# Patient Record
Sex: MALE | Race: WHITE | NOT HISPANIC OR LATINO | URBAN - METROPOLITAN AREA
[De-identification: names, ages, dates, MRNs, and addresses within clinical notes are randomized per-mention and may not be internally consistent; named-entity substitution may affect disease eponyms.]

---

## 2017-05-19 ENCOUNTER — EMERGENCY (EMERGENCY)
Facility: HOSPITAL | Age: 62
LOS: 1 days | Discharge: HOME | End: 2017-05-19
Attending: EMERGENCY MEDICINE
Payer: MEDICARE

## 2017-05-19 VITALS
DIASTOLIC BLOOD PRESSURE: 102 MMHG | OXYGEN SATURATION: 99 % | WEIGHT: 149.91 LBS | HEIGHT: 64 IN | RESPIRATION RATE: 18 BRPM | TEMPERATURE: 98 F | SYSTOLIC BLOOD PRESSURE: 186 MMHG | HEART RATE: 79 BPM

## 2017-05-19 DIAGNOSIS — Z98.89 OTHER SPECIFIED POSTPROCEDURAL STATES: Chronic | ICD-10-CM

## 2017-05-19 LAB
ALBUMIN SERPL ELPH-MCNC: 4.1 G/DL — SIGNIFICANT CHANGE UP (ref 3.5–5)
ALP SERPL-CCNC: 57 U/L — SIGNIFICANT CHANGE UP (ref 40–120)
ALT FLD-CCNC: 41 U/L DA — SIGNIFICANT CHANGE UP (ref 10–60)
ANION GAP SERPL CALC-SCNC: 8 MMOL/L — SIGNIFICANT CHANGE UP (ref 5–17)
AST SERPL-CCNC: 48 U/L — HIGH (ref 10–40)
BASOPHILS # BLD AUTO: 0.1 K/UL — SIGNIFICANT CHANGE UP (ref 0–0.2)
BASOPHILS NFR BLD AUTO: 1.3 % — SIGNIFICANT CHANGE UP (ref 0–2)
BILIRUB SERPL-MCNC: 0.8 MG/DL — SIGNIFICANT CHANGE UP (ref 0.2–1.2)
BUN SERPL-MCNC: 11 MG/DL — SIGNIFICANT CHANGE UP (ref 7–18)
CALCIUM SERPL-MCNC: 9.2 MG/DL — SIGNIFICANT CHANGE UP (ref 8.4–10.5)
CHLORIDE SERPL-SCNC: 106 MMOL/L — SIGNIFICANT CHANGE UP (ref 96–108)
CO2 SERPL-SCNC: 25 MMOL/L — SIGNIFICANT CHANGE UP (ref 22–31)
CREAT SERPL-MCNC: 0.82 MG/DL — SIGNIFICANT CHANGE UP (ref 0.5–1.3)
EOSINOPHIL # BLD AUTO: 0.1 K/UL — SIGNIFICANT CHANGE UP (ref 0–0.5)
EOSINOPHIL NFR BLD AUTO: 0.8 % — SIGNIFICANT CHANGE UP (ref 0–6)
GLUCOSE SERPL-MCNC: 126 MG/DL — HIGH (ref 70–99)
HCT VFR BLD CALC: 49.8 % — SIGNIFICANT CHANGE UP (ref 39–50)
HGB BLD-MCNC: 16.9 G/DL — SIGNIFICANT CHANGE UP (ref 13–17)
LYMPHOCYTES # BLD AUTO: 1.9 K/UL — SIGNIFICANT CHANGE UP (ref 1–3.3)
LYMPHOCYTES # BLD AUTO: 17.6 % — SIGNIFICANT CHANGE UP (ref 13–44)
MCHC RBC-ENTMCNC: 31 PG — SIGNIFICANT CHANGE UP (ref 27–34)
MCHC RBC-ENTMCNC: 33.9 GM/DL — SIGNIFICANT CHANGE UP (ref 32–36)
MCV RBC AUTO: 91.4 FL — SIGNIFICANT CHANGE UP (ref 80–100)
MONOCYTES # BLD AUTO: 0.9 K/UL — SIGNIFICANT CHANGE UP (ref 0–0.9)
MONOCYTES NFR BLD AUTO: 8.3 % — SIGNIFICANT CHANGE UP (ref 2–14)
NEUTROPHILS # BLD AUTO: 7.7 K/UL — HIGH (ref 1.8–7.4)
NEUTROPHILS NFR BLD AUTO: 72 % — SIGNIFICANT CHANGE UP (ref 43–77)
PLATELET # BLD AUTO: 376 K/UL — SIGNIFICANT CHANGE UP (ref 150–400)
POTASSIUM SERPL-MCNC: 4.4 MMOL/L — SIGNIFICANT CHANGE UP (ref 3.5–5.3)
POTASSIUM SERPL-SCNC: 4.4 MMOL/L — SIGNIFICANT CHANGE UP (ref 3.5–5.3)
PROT SERPL-MCNC: 8 G/DL — SIGNIFICANT CHANGE UP (ref 6–8.3)
RBC # BLD: 5.44 M/UL — SIGNIFICANT CHANGE UP (ref 4.2–5.8)
RBC # FLD: 11.8 % — SIGNIFICANT CHANGE UP (ref 10.3–14.5)
SODIUM SERPL-SCNC: 139 MMOL/L — SIGNIFICANT CHANGE UP (ref 135–145)
TROPONIN I SERPL-MCNC: <0.015 NG/ML — SIGNIFICANT CHANGE UP (ref 0–0.04)
WBC # BLD: 10.7 K/UL — HIGH (ref 3.8–10.5)
WBC # FLD AUTO: 10.7 K/UL — HIGH (ref 3.8–10.5)

## 2017-05-19 PROCEDURE — 96375 TX/PRO/DX INJ NEW DRUG ADDON: CPT

## 2017-05-19 PROCEDURE — 71045 X-RAY EXAM CHEST 1 VIEW: CPT

## 2017-05-19 PROCEDURE — 84484 ASSAY OF TROPONIN QUANT: CPT

## 2017-05-19 PROCEDURE — 80053 COMPREHEN METABOLIC PANEL: CPT

## 2017-05-19 PROCEDURE — 93005 ELECTROCARDIOGRAM TRACING: CPT

## 2017-05-19 PROCEDURE — 96374 THER/PROPH/DIAG INJ IV PUSH: CPT

## 2017-05-19 PROCEDURE — 85027 COMPLETE CBC AUTOMATED: CPT

## 2017-05-19 PROCEDURE — 99284 EMERGENCY DEPT VISIT MOD MDM: CPT

## 2017-05-19 PROCEDURE — 99284 EMERGENCY DEPT VISIT MOD MDM: CPT | Mod: 25

## 2017-05-19 PROCEDURE — 71010: CPT | Mod: 26

## 2017-05-19 RX ORDER — ONDANSETRON 8 MG/1
4 TABLET, FILM COATED ORAL ONCE
Qty: 0 | Refills: 0 | Status: COMPLETED | OUTPATIENT
Start: 2017-05-19 | End: 2017-05-19

## 2017-05-19 RX ORDER — KETOROLAC TROMETHAMINE 30 MG/ML
30 SYRINGE (ML) INJECTION ONCE
Qty: 0 | Refills: 0 | Status: DISCONTINUED | OUTPATIENT
Start: 2017-05-19 | End: 2017-05-19

## 2017-05-19 RX ORDER — MORPHINE SULFATE 50 MG/1
2 CAPSULE, EXTENDED RELEASE ORAL ONCE
Qty: 0 | Refills: 0 | Status: DISCONTINUED | OUTPATIENT
Start: 2017-05-19 | End: 2017-05-19

## 2017-05-19 RX ADMIN — Medication 30 MILLIGRAM(S): at 09:16

## 2017-05-19 RX ADMIN — MORPHINE SULFATE 2 MILLIGRAM(S): 50 CAPSULE, EXTENDED RELEASE ORAL at 09:16

## 2017-05-19 RX ADMIN — ONDANSETRON 4 MILLIGRAM(S): 8 TABLET, FILM COATED ORAL at 09:16

## 2017-05-19 NOTE — ED PROVIDER NOTE - NS ED MD SCRIBE ATTENDING SCRIBE SECTIONS
PAST MEDICAL/SURGICAL/SOCIAL HISTORY/PHYSICAL EXAM/HIV/HISTORY OF PRESENT ILLNESS/DISPOSITION/VITAL SIGNS( Pullset)/REVIEW OF SYSTEMS

## 2017-05-19 NOTE — ED ADULT NURSE NOTE - OBJECTIVE STATEMENT
Patient came to the ED a/o x 3 ambulates c/o left side chest pain radiating to the left arm. Patient states the pain started 2 days ago, no c/o dizziness noted.

## 2017-05-19 NOTE — ED PROVIDER NOTE - MEDICAL DECISION MAKING DETAILS
chest wall pain, likely muscular given recent normal cardiac workup and normal labs and ecg today. pt had htn today, but attributed to pain. mentioned that he needs to recheck bp in a few days to determine if htn diagnosis. plans to follow up with pmd. gave copy of results. discussed cxr finding of hylar lymph node which he plans to follow up in a month. pain much improved with meds. discussed anticipatory guidance.

## 2017-05-19 NOTE — ED PROVIDER NOTE - OBJECTIVE STATEMENT
62 y/o male with PMHx of Diverticulitis and Seizure and PSHx of Appendectomy and Sigmoidectomy presents to the ED c/o chest pain and L arm pain today. Pt notes associated nausea. Pt went to the Cardiologist 1 month ago and had a stress test and full cardiac workup which came back normal. Pt notes lifting heavy objects regularly. Pt denies fever, chills, lightheadedness, diaphoresis, SOB, infectious symptoms, or any other complaints. Allergies: Motrin, Aspirin (hives). 62 y/o male with PMHx of Diverticulitis and Seizure and PSHx of Appendectomy and Sigmoidectomy presents to the ED c/o chest pain and L arm pain today. Pt notes associated nausea. Pt went to the Cardiologist 1 month ago and had a stress test and full cardiac workup (stress test and catheterism) which were normal. Pt notes lifting heavy objects regularly. Pt denies fever, chills, lightheadedness, diaphoresis, SOB, infectious symptoms, or any other complaints. Allergies: Motrin, Aspirin (hives).

## 2017-05-23 DIAGNOSIS — G40.909 EPILEPSY, UNSPECIFIED, NOT INTRACTABLE, WITHOUT STATUS EPILEPTICUS: ICD-10-CM

## 2017-05-23 DIAGNOSIS — R03.0 ELEVATED BLOOD-PRESSURE READING, WITHOUT DIAGNOSIS OF HYPERTENSION: ICD-10-CM

## 2017-05-23 DIAGNOSIS — R11.0 NAUSEA: ICD-10-CM

## 2017-05-23 DIAGNOSIS — R59.0 LOCALIZED ENLARGED LYMPH NODES: ICD-10-CM

## 2017-05-23 DIAGNOSIS — Z88.6 ALLERGY STATUS TO ANALGESIC AGENT: ICD-10-CM

## 2017-05-23 DIAGNOSIS — M79.602 PAIN IN LEFT ARM: ICD-10-CM

## 2017-05-23 DIAGNOSIS — Z87.19 PERSONAL HISTORY OF OTHER DISEASES OF THE DIGESTIVE SYSTEM: ICD-10-CM

## 2017-05-23 DIAGNOSIS — R00.0 TACHYCARDIA, UNSPECIFIED: ICD-10-CM

## 2017-05-23 DIAGNOSIS — R07.89 OTHER CHEST PAIN: ICD-10-CM

## 2018-03-06 NOTE — ED PROVIDER NOTE - CONSTITUTIONAL, MLM
[phone] normal... Well appearing, well nourished, awake, alert, oriented to person, place, time/situation and in no apparent distress.

## 2018-04-04 ENCOUNTER — EMERGENCY (EMERGENCY)
Facility: HOSPITAL | Age: 63
LOS: 1 days | Discharge: HOME | End: 2018-04-04
Attending: EMERGENCY MEDICINE
Payer: MEDICARE

## 2018-04-04 VITALS
DIASTOLIC BLOOD PRESSURE: 97 MMHG | RESPIRATION RATE: 18 BRPM | SYSTOLIC BLOOD PRESSURE: 132 MMHG | OXYGEN SATURATION: 98 % | HEART RATE: 106 BPM | TEMPERATURE: 98 F

## 2018-04-04 VITALS
SYSTOLIC BLOOD PRESSURE: 134 MMHG | TEMPERATURE: 98 F | OXYGEN SATURATION: 100 % | RESPIRATION RATE: 16 BRPM | DIASTOLIC BLOOD PRESSURE: 88 MMHG | HEART RATE: 95 BPM

## 2018-04-04 DIAGNOSIS — Z98.89 OTHER SPECIFIED POSTPROCEDURAL STATES: Chronic | ICD-10-CM

## 2018-04-04 LAB
ALBUMIN SERPL ELPH-MCNC: 4.5 G/DL — SIGNIFICANT CHANGE UP (ref 3.5–5)
ALP SERPL-CCNC: 57 U/L — SIGNIFICANT CHANGE UP (ref 40–120)
ALT FLD-CCNC: 36 U/L DA — SIGNIFICANT CHANGE UP (ref 10–60)
ANION GAP SERPL CALC-SCNC: 10 MMOL/L — SIGNIFICANT CHANGE UP (ref 5–17)
AST SERPL-CCNC: 34 U/L — SIGNIFICANT CHANGE UP (ref 10–40)
BASOPHILS # BLD AUTO: 0.1 K/UL — SIGNIFICANT CHANGE UP (ref 0–0.2)
BASOPHILS NFR BLD AUTO: 0.4 % — SIGNIFICANT CHANGE UP (ref 0–2)
BILIRUB SERPL-MCNC: 0.9 MG/DL — SIGNIFICANT CHANGE UP (ref 0.2–1.2)
BUN SERPL-MCNC: 15 MG/DL — SIGNIFICANT CHANGE UP (ref 7–18)
CALCIUM SERPL-MCNC: 9.4 MG/DL — SIGNIFICANT CHANGE UP (ref 8.4–10.5)
CHLORIDE SERPL-SCNC: 101 MMOL/L — SIGNIFICANT CHANGE UP (ref 96–108)
CK MB BLD-MCNC: 1 % — SIGNIFICANT CHANGE UP (ref 0–3.5)
CK MB CFR SERPL CALC: 2.7 NG/ML — SIGNIFICANT CHANGE UP (ref 0–3.6)
CK SERPL-CCNC: 278 U/L — HIGH (ref 35–232)
CO2 SERPL-SCNC: 25 MMOL/L — SIGNIFICANT CHANGE UP (ref 22–31)
CREAT SERPL-MCNC: 0.96 MG/DL — SIGNIFICANT CHANGE UP (ref 0.5–1.3)
EOSINOPHIL # BLD AUTO: 0 K/UL — SIGNIFICANT CHANGE UP (ref 0–0.5)
EOSINOPHIL NFR BLD AUTO: 0.1 % — SIGNIFICANT CHANGE UP (ref 0–6)
GLUCOSE SERPL-MCNC: 103 MG/DL — HIGH (ref 70–99)
HCT VFR BLD CALC: 54.5 % — HIGH (ref 39–50)
HGB BLD-MCNC: 18 G/DL — HIGH (ref 13–17)
LIDOCAIN IGE QN: 83 U/L — SIGNIFICANT CHANGE UP (ref 73–393)
LYMPHOCYTES # BLD AUTO: 1.5 K/UL — SIGNIFICANT CHANGE UP (ref 1–3.3)
LYMPHOCYTES # BLD AUTO: 9.2 % — LOW (ref 13–44)
MCHC RBC-ENTMCNC: 30.6 PG — SIGNIFICANT CHANGE UP (ref 27–34)
MCHC RBC-ENTMCNC: 33 GM/DL — SIGNIFICANT CHANGE UP (ref 32–36)
MCV RBC AUTO: 92.5 FL — SIGNIFICANT CHANGE UP (ref 80–100)
MONOCYTES # BLD AUTO: 1.1 K/UL — HIGH (ref 0–0.9)
MONOCYTES NFR BLD AUTO: 6.9 % — SIGNIFICANT CHANGE UP (ref 2–14)
NEUTROPHILS # BLD AUTO: 13.5 K/UL — HIGH (ref 1.8–7.4)
NEUTROPHILS NFR BLD AUTO: 83.5 % — HIGH (ref 43–77)
PLATELET # BLD AUTO: 356 K/UL — SIGNIFICANT CHANGE UP (ref 150–400)
POTASSIUM SERPL-MCNC: 3.9 MMOL/L — SIGNIFICANT CHANGE UP (ref 3.5–5.3)
POTASSIUM SERPL-SCNC: 3.9 MMOL/L — SIGNIFICANT CHANGE UP (ref 3.5–5.3)
PROT SERPL-MCNC: 8.4 G/DL — HIGH (ref 6–8.3)
RBC # BLD: 5.89 M/UL — HIGH (ref 4.2–5.8)
RBC # FLD: 11.5 % — SIGNIFICANT CHANGE UP (ref 10.3–14.5)
SODIUM SERPL-SCNC: 136 MMOL/L — SIGNIFICANT CHANGE UP (ref 135–145)
TROPONIN I SERPL-MCNC: <0.015 NG/ML — SIGNIFICANT CHANGE UP (ref 0–0.04)
WBC # BLD: 16.1 K/UL — HIGH (ref 3.8–10.5)
WBC # FLD AUTO: 16.1 K/UL — HIGH (ref 3.8–10.5)

## 2018-04-04 PROCEDURE — 99284 EMERGENCY DEPT VISIT MOD MDM: CPT | Mod: 25

## 2018-04-04 PROCEDURE — 82553 CREATINE MB FRACTION: CPT

## 2018-04-04 PROCEDURE — 93005 ELECTROCARDIOGRAM TRACING: CPT

## 2018-04-04 PROCEDURE — 71046 X-RAY EXAM CHEST 2 VIEWS: CPT | Mod: 26

## 2018-04-04 PROCEDURE — 84484 ASSAY OF TROPONIN QUANT: CPT

## 2018-04-04 PROCEDURE — 99284 EMERGENCY DEPT VISIT MOD MDM: CPT

## 2018-04-04 PROCEDURE — 80053 COMPREHEN METABOLIC PANEL: CPT

## 2018-04-04 PROCEDURE — 96375 TX/PRO/DX INJ NEW DRUG ADDON: CPT

## 2018-04-04 PROCEDURE — 71046 X-RAY EXAM CHEST 2 VIEWS: CPT

## 2018-04-04 PROCEDURE — 82550 ASSAY OF CK (CPK): CPT

## 2018-04-04 PROCEDURE — 85027 COMPLETE CBC AUTOMATED: CPT

## 2018-04-04 PROCEDURE — 96374 THER/PROPH/DIAG INJ IV PUSH: CPT

## 2018-04-04 PROCEDURE — 83690 ASSAY OF LIPASE: CPT

## 2018-04-04 RX ORDER — SODIUM CHLORIDE 9 MG/ML
2000 INJECTION INTRAMUSCULAR; INTRAVENOUS; SUBCUTANEOUS ONCE
Qty: 0 | Refills: 0 | Status: COMPLETED | OUTPATIENT
Start: 2018-04-04 | End: 2018-04-04

## 2018-04-04 RX ORDER — METRONIDAZOLE 500 MG
1 TABLET ORAL
Qty: 30 | Refills: 0
Start: 2018-04-04 | End: 2018-04-13

## 2018-04-04 RX ORDER — ONDANSETRON 8 MG/1
4 TABLET, FILM COATED ORAL ONCE
Qty: 0 | Refills: 0 | Status: COMPLETED | OUTPATIENT
Start: 2018-04-04 | End: 2018-04-04

## 2018-04-04 RX ORDER — FAMOTIDINE 10 MG/ML
20 INJECTION INTRAVENOUS ONCE
Qty: 0 | Refills: 0 | Status: COMPLETED | OUTPATIENT
Start: 2018-04-04 | End: 2018-04-04

## 2018-04-04 RX ORDER — MOXIFLOXACIN HYDROCHLORIDE TABLETS, 400 MG 400 MG/1
1 TABLET, FILM COATED ORAL
Qty: 20 | Refills: 0
Start: 2018-04-04 | End: 2018-04-13

## 2018-04-04 RX ADMIN — ONDANSETRON 4 MILLIGRAM(S): 8 TABLET, FILM COATED ORAL at 14:52

## 2018-04-04 RX ADMIN — SODIUM CHLORIDE 2000 MILLILITER(S): 9 INJECTION INTRAMUSCULAR; INTRAVENOUS; SUBCUTANEOUS at 14:53

## 2018-04-04 RX ADMIN — FAMOTIDINE 20 MILLIGRAM(S): 10 INJECTION INTRAVENOUS at 14:52

## 2018-04-04 NOTE — ED PROVIDER NOTE - PROGRESS NOTE DETAILS
shahzad  pt ate pedialyte pop and able to drink water with no further vomiting  ambulatory in ed without asst.

## 2018-04-04 NOTE — ED PROVIDER NOTE - MEDICAL DECISION MAKING DETAILS
pt with recent travel no with n/v/d  will give fluids, anitemetics and reassess  also will cover with cipro/flagyl givnen travel.  pt to follow up with pmd

## 2018-04-04 NOTE — ED ADULT NURSE NOTE - OBJECTIVE STATEMENT
pt a&ox3, here with c/o vomiting. pt states he returned from [place] one week ago and has been having n/v/d since then. denies fever, chills, CP.

## 2018-04-04 NOTE — ED PROVIDER NOTE - OBJECTIVE STATEMENT
travelers diarrhea travelers diarrhea  pt recently returned from [place] and now has n/v/d for several days  unable to keep down any liquids or food for over one day.  subjective fever and chills

## 2019-03-18 ENCOUNTER — APPOINTMENT (OUTPATIENT)
Dept: OTHER | Facility: CLINIC | Age: 64
End: 2019-03-18

## 2019-03-18 ENCOUNTER — APPOINTMENT (OUTPATIENT)
Dept: OTHER | Facility: CLINIC | Age: 64
End: 2019-03-18
Payer: COMMERCIAL

## 2019-03-18 VITALS
OXYGEN SATURATION: 96 % | RESPIRATION RATE: 16 BRPM | DIASTOLIC BLOOD PRESSURE: 100 MMHG | BODY MASS INDEX: 24.75 KG/M2 | SYSTOLIC BLOOD PRESSURE: 150 MMHG | HEIGHT: 64 IN | HEART RATE: 84 BPM | WEIGHT: 145 LBS

## 2019-03-18 VITALS — SYSTOLIC BLOOD PRESSURE: 140 MMHG | DIASTOLIC BLOOD PRESSURE: 88 MMHG

## 2019-03-18 DIAGNOSIS — Z82.49 FAMILY HISTORY OF ISCHEMIC HEART DISEASE AND OTHER DISEASES OF THE CIRCULATORY SYSTEM: ICD-10-CM

## 2019-03-18 DIAGNOSIS — I10 ESSENTIAL (PRIMARY) HYPERTENSION: ICD-10-CM

## 2019-03-18 DIAGNOSIS — N40.0 BENIGN PROSTATIC HYPERPLASIA WITHOUT LOWER URINARY TRACT SYMPMS: ICD-10-CM

## 2019-03-18 DIAGNOSIS — M75.100 UNSPECIFIED ROTATOR CUFF TEAR OR RUPTURE OF UNSPECIFIED SHOULDER, NOT SPECIFIED AS TRAUMATIC: ICD-10-CM

## 2019-03-18 DIAGNOSIS — K21.9 GASTRO-ESOPHAGEAL REFLUX DISEASE W/OUT ESOPHAGITIS: ICD-10-CM

## 2019-03-18 PROCEDURE — 94010 BREATHING CAPACITY TEST: CPT

## 2019-03-18 PROCEDURE — 99396 PREV VISIT EST AGE 40-64: CPT | Mod: 25

## 2019-03-18 RX ORDER — ALPRAZOLAM 0.5 MG/1
0.5 TABLET ORAL
Qty: 6 | Refills: 0 | Status: DISCONTINUED | COMMUNITY
Start: 2019-02-09 | End: 2019-03-18

## 2019-03-18 RX ORDER — METOPROLOL SUCCINATE 50 MG/1
50 TABLET, EXTENDED RELEASE ORAL
Qty: 30 | Refills: 0 | Status: ACTIVE | COMMUNITY
Start: 2019-03-14

## 2019-03-18 RX ORDER — ATORVASTATIN CALCIUM 10 MG/1
10 TABLET, FILM COATED ORAL
Qty: 30 | Refills: 0 | Status: ACTIVE | COMMUNITY
Start: 2018-11-10

## 2019-03-18 RX ORDER — FAMOTIDINE 20 MG/1
20 TABLET, FILM COATED ORAL
Qty: 60 | Refills: 0 | Status: ACTIVE | COMMUNITY
Start: 2018-12-19

## 2019-03-18 NOTE — DISCUSSION/SUMMARY
[Patient seen for WTC Monitoring ___] : Patient was seen for WTC monitoring [unfilled] [Please See Note in Chart and Documentation in Trial DB] : Please see note in chart and documentation in Trial DB. [FreeTextEntry3] : [facility] Monitoring Evaluation\par \par  ID#: [id number] 	               \par  Visit: 2	                        \par  Date: 3/18/19\par \par HPI: 63 yr old white male presents to [facility] for 2nd annual monitoring visit. He c/o occasional wheezing and cough but spoke to his PCP who does not want to treat due to very sporadic episodes per pt. Thought perhaps caused by GERD. He is treated for BPH, HTN, and HLD. He's had GERD since 1990 and has Low's esophagus. \par \par PCP: Dr. 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 He worked every day for first few days then 3x week as it was part of his regular route. \par Occupational Hx: DOS\par PMH:See Allscripts and Trial DB\par PSH: See Allscripts and Trial DB \par Family History: See Allscripts and Trial DB \par Preventive Screening: \par Colonoscopy-2017                           \par CXR-will do today\par Prostate exam-2018\par Allergies: See Allscripts and Trial DB \par Meds: See Trial DB and Allscripts \par Social  Hx: See Allscripts and Trial DB\par Smoking Status: See Allscripts and Trial DB \par Review of Systems-IAMQ reviewed with patient\par \par PHYSICAL EXAM: See Trial DB.  \par \par Spirometry: Reviewed. Normal\par \par Assessment:\par HTN\par HLD\par BPH\par Torn rotator cuff\par \par Plan: \par CBC, CMP, lipids, UA, spirometry, CXR ordered\par Continue present medications\par F/U at [facility] in 1 year\par \par \par   \par \par \par \par \par \par \par \par \par

## 2019-03-19 LAB
ALBUMIN SERPL ELPH-MCNC: 4.7 G/DL
ALP BLD-CCNC: 46 U/L
ALT SERPL-CCNC: 25 U/L
ANION GAP SERPL CALC-SCNC: 9 MMOL/L
APPEARANCE: ABNORMAL
AST SERPL-CCNC: 28 U/L
BACTERIA: NEGATIVE
BASOPHILS # BLD AUTO: 0.09 K/UL
BASOPHILS NFR BLD AUTO: 1.1 %
BILIRUB SERPL-MCNC: 0.4 MG/DL
BILIRUBIN URINE: NEGATIVE
BLOOD URINE: NEGATIVE
BUN SERPL-MCNC: 14 MG/DL
CALCIUM SERPL-MCNC: 10.1 MG/DL
CHLORIDE SERPL-SCNC: 102 MMOL/L
CHOLEST SERPL-MCNC: 209 MG/DL
CHOLEST/HDLC SERPL: 3.4 RATIO
CO2 SERPL-SCNC: 31 MMOL/L
COLOR: YELLOW
CREAT SERPL-MCNC: 0.73 MG/DL
EOSINOPHIL # BLD AUTO: 0.08 K/UL
EOSINOPHIL NFR BLD AUTO: 1 %
GLUCOSE QUALITATIVE U: NEGATIVE
GLUCOSE SERPL-MCNC: 104 MG/DL
HCT VFR BLD CALC: 49 %
HDLC SERPL-MCNC: 62 MG/DL
HGB BLD-MCNC: 16 G/DL
HYALINE CASTS: 0 /LPF
IMM GRANULOCYTES NFR BLD AUTO: 0.4 %
KETONES URINE: NEGATIVE
LDLC SERPL CALC-MCNC: 101 MG/DL
LEUKOCYTE ESTERASE URINE: NEGATIVE
LYMPHOCYTES # BLD AUTO: 1.45 K/UL
LYMPHOCYTES NFR BLD AUTO: 17.4 %
MAN DIFF?: NORMAL
MCHC RBC-ENTMCNC: 31 PG
MCHC RBC-ENTMCNC: 32.7 GM/DL
MCV RBC AUTO: 95 FL
MICROSCOPIC-UA: NORMAL
MONOCYTES # BLD AUTO: 0.81 K/UL
MONOCYTES NFR BLD AUTO: 9.7 %
NEUTROPHILS # BLD AUTO: 5.86 K/UL
NEUTROPHILS NFR BLD AUTO: 70.4 %
NITRITE URINE: NEGATIVE
PH URINE: 8.5
PLATELET # BLD AUTO: 363 K/UL
POTASSIUM SERPL-SCNC: 5.1 MMOL/L
PROT SERPL-MCNC: 7.3 G/DL
PROTEIN URINE: NEGATIVE
RBC # BLD: 5.16 M/UL
RBC # FLD: 12.7 %
RED BLOOD CELLS URINE: 1 /HPF
SODIUM SERPL-SCNC: 142 MMOL/L
SPECIFIC GRAVITY URINE: 1.02
SQUAMOUS EPITHELIAL CELLS: 0 /HPF
TRIGL SERPL-MCNC: 231 MG/DL
UROBILINOGEN URINE: NORMAL
WBC # FLD AUTO: 8.32 K/UL
WHITE BLOOD CELLS URINE: 1 /HPF

## 2019-12-06 ENCOUNTER — INPATIENT (INPATIENT)
Facility: HOSPITAL | Age: 64
LOS: 2 days | Discharge: HOME | DRG: 379 | End: 2019-12-09
Attending: INTERNAL MEDICINE | Admitting: INTERNAL MEDICINE
Payer: MEDICARE

## 2019-12-06 ENCOUNTER — RESULT REVIEW (OUTPATIENT)
Age: 64
End: 2019-12-06

## 2019-12-06 VITALS
HEART RATE: 121 BPM | OXYGEN SATURATION: 100 % | HEIGHT: 64 IN | RESPIRATION RATE: 20 BRPM | SYSTOLIC BLOOD PRESSURE: 126 MMHG | DIASTOLIC BLOOD PRESSURE: 86 MMHG | WEIGHT: 141.98 LBS | TEMPERATURE: 98 F

## 2019-12-06 DIAGNOSIS — E78.5 HYPERLIPIDEMIA, UNSPECIFIED: ICD-10-CM

## 2019-12-06 DIAGNOSIS — K92.2 GASTROINTESTINAL HEMORRHAGE, UNSPECIFIED: ICD-10-CM

## 2019-12-06 DIAGNOSIS — Z29.9 ENCOUNTER FOR PROPHYLACTIC MEASURES, UNSPECIFIED: ICD-10-CM

## 2019-12-06 DIAGNOSIS — Z98.89 OTHER SPECIFIED POSTPROCEDURAL STATES: Chronic | ICD-10-CM

## 2019-12-06 DIAGNOSIS — R19.5 OTHER FECAL ABNORMALITIES: ICD-10-CM

## 2019-12-06 DIAGNOSIS — N40.0 BENIGN PROSTATIC HYPERPLASIA WITHOUT LOWER URINARY TRACT SYMPTOMS: ICD-10-CM

## 2019-12-06 DIAGNOSIS — K29.70 GASTRITIS, UNSPECIFIED, WITHOUT BLEEDING: ICD-10-CM

## 2019-12-06 LAB
24R-OH-CALCIDIOL SERPL-MCNC: 54.4 NG/ML — SIGNIFICANT CHANGE UP (ref 30–80)
ALBUMIN SERPL ELPH-MCNC: 2.9 G/DL — LOW (ref 3.5–5)
ALBUMIN SERPL ELPH-MCNC: 3.5 G/DL — SIGNIFICANT CHANGE UP (ref 3.5–5)
ALP SERPL-CCNC: 33 U/L — LOW (ref 40–120)
ALP SERPL-CCNC: 39 U/L — LOW (ref 40–120)
ALT FLD-CCNC: 24 U/L DA — SIGNIFICANT CHANGE UP (ref 10–60)
ALT FLD-CCNC: 29 U/L DA — SIGNIFICANT CHANGE UP (ref 10–60)
ANION GAP SERPL CALC-SCNC: 7 MMOL/L — SIGNIFICANT CHANGE UP (ref 5–17)
ANION GAP SERPL CALC-SCNC: 8 MMOL/L — SIGNIFICANT CHANGE UP (ref 5–17)
APTT BLD: 25 SEC — LOW (ref 27.5–36.3)
AST SERPL-CCNC: 15 U/L — SIGNIFICANT CHANGE UP (ref 10–40)
AST SERPL-CCNC: 17 U/L — SIGNIFICANT CHANGE UP (ref 10–40)
BASOPHILS # BLD AUTO: 0.04 K/UL — SIGNIFICANT CHANGE UP (ref 0–0.2)
BASOPHILS NFR BLD AUTO: 0.3 % — SIGNIFICANT CHANGE UP (ref 0–2)
BILIRUB DIRECT SERPL-MCNC: 0.1 MG/DL — SIGNIFICANT CHANGE UP (ref 0–0.2)
BILIRUB INDIRECT FLD-MCNC: 0.3 MG/DL — SIGNIFICANT CHANGE UP (ref 0.2–1)
BILIRUB SERPL-MCNC: 0.4 MG/DL — SIGNIFICANT CHANGE UP (ref 0.2–1.2)
BUN SERPL-MCNC: 13 MG/DL — SIGNIFICANT CHANGE UP (ref 7–18)
BUN SERPL-MCNC: 21 MG/DL — HIGH (ref 7–18)
CALCIUM SERPL-MCNC: 8.2 MG/DL — LOW (ref 8.4–10.5)
CALCIUM SERPL-MCNC: 9 MG/DL — SIGNIFICANT CHANGE UP (ref 8.4–10.5)
CHLORIDE SERPL-SCNC: 104 MMOL/L — SIGNIFICANT CHANGE UP (ref 96–108)
CHLORIDE SERPL-SCNC: 110 MMOL/L — HIGH (ref 96–108)
CHOLEST SERPL-MCNC: 125 MG/DL — SIGNIFICANT CHANGE UP (ref 10–199)
CO2 SERPL-SCNC: 24 MMOL/L — SIGNIFICANT CHANGE UP (ref 22–31)
CO2 SERPL-SCNC: 26 MMOL/L — SIGNIFICANT CHANGE UP (ref 22–31)
CREAT SERPL-MCNC: 0.62 MG/DL — SIGNIFICANT CHANGE UP (ref 0.5–1.3)
CREAT SERPL-MCNC: 0.84 MG/DL — SIGNIFICANT CHANGE UP (ref 0.5–1.3)
EOSINOPHIL # BLD AUTO: 0.01 K/UL — SIGNIFICANT CHANGE UP (ref 0–0.5)
EOSINOPHIL NFR BLD AUTO: 0.1 % — SIGNIFICANT CHANGE UP (ref 0–6)
FOLATE SERPL-MCNC: >20 NG/ML — SIGNIFICANT CHANGE UP
GLUCOSE SERPL-MCNC: 147 MG/DL — HIGH (ref 70–99)
GLUCOSE SERPL-MCNC: 90 MG/DL — SIGNIFICANT CHANGE UP (ref 70–99)
HBA1C BLD-MCNC: 5.4 % — SIGNIFICANT CHANGE UP (ref 4–5.6)
HCT VFR BLD CALC: 28.1 % — LOW (ref 39–50)
HCT VFR BLD CALC: 28.6 % — LOW (ref 39–50)
HCT VFR BLD CALC: 34 % — LOW (ref 39–50)
HDLC SERPL-MCNC: 40 MG/DL — SIGNIFICANT CHANGE UP
HGB BLD-MCNC: 11.7 G/DL — LOW (ref 13–17)
HGB BLD-MCNC: 9.3 G/DL — LOW (ref 13–17)
HGB BLD-MCNC: 9.6 G/DL — LOW (ref 13–17)
IMM GRANULOCYTES NFR BLD AUTO: 0.5 % — SIGNIFICANT CHANGE UP (ref 0–1.5)
INR BLD: 1.15 RATIO — SIGNIFICANT CHANGE UP (ref 0.88–1.16)
LIDOCAIN IGE QN: 71 U/L — LOW (ref 73–393)
LIPID PNL WITH DIRECT LDL SERPL: 61 MG/DL — SIGNIFICANT CHANGE UP
LYMPHOCYTES # BLD AUTO: 1.58 K/UL — SIGNIFICANT CHANGE UP (ref 1–3.3)
LYMPHOCYTES # BLD AUTO: 11.9 % — LOW (ref 13–44)
MCHC RBC-ENTMCNC: 31 PG — SIGNIFICANT CHANGE UP (ref 27–34)
MCHC RBC-ENTMCNC: 31.3 PG — SIGNIFICANT CHANGE UP (ref 27–34)
MCHC RBC-ENTMCNC: 31.5 PG — SIGNIFICANT CHANGE UP (ref 27–34)
MCHC RBC-ENTMCNC: 33.1 GM/DL — SIGNIFICANT CHANGE UP (ref 32–36)
MCHC RBC-ENTMCNC: 33.6 GM/DL — SIGNIFICANT CHANGE UP (ref 32–36)
MCHC RBC-ENTMCNC: 34.4 GM/DL — SIGNIFICANT CHANGE UP (ref 32–36)
MCV RBC AUTO: 91.4 FL — SIGNIFICANT CHANGE UP (ref 80–100)
MCV RBC AUTO: 93.2 FL — SIGNIFICANT CHANGE UP (ref 80–100)
MCV RBC AUTO: 93.7 FL — SIGNIFICANT CHANGE UP (ref 80–100)
MONOCYTES # BLD AUTO: 1.01 K/UL — HIGH (ref 0–0.9)
MONOCYTES NFR BLD AUTO: 7.6 % — SIGNIFICANT CHANGE UP (ref 2–14)
NEUTROPHILS # BLD AUTO: 10.58 K/UL — HIGH (ref 1.8–7.4)
NEUTROPHILS NFR BLD AUTO: 79.6 % — HIGH (ref 43–77)
NRBC # BLD: 0 /100 WBCS — SIGNIFICANT CHANGE UP (ref 0–0)
OB PNL STL: POSITIVE
PLATELET # BLD AUTO: 257 K/UL — SIGNIFICANT CHANGE UP (ref 150–400)
PLATELET # BLD AUTO: 277 K/UL — SIGNIFICANT CHANGE UP (ref 150–400)
PLATELET # BLD AUTO: 326 K/UL — SIGNIFICANT CHANGE UP (ref 150–400)
POTASSIUM SERPL-MCNC: 3.6 MMOL/L — SIGNIFICANT CHANGE UP (ref 3.5–5.3)
POTASSIUM SERPL-MCNC: 3.7 MMOL/L — SIGNIFICANT CHANGE UP (ref 3.5–5.3)
POTASSIUM SERPL-SCNC: 3.6 MMOL/L — SIGNIFICANT CHANGE UP (ref 3.5–5.3)
POTASSIUM SERPL-SCNC: 3.7 MMOL/L — SIGNIFICANT CHANGE UP (ref 3.5–5.3)
PROT SERPL-MCNC: 5.8 G/DL — LOW (ref 6–8.3)
PROT SERPL-MCNC: 7.1 G/DL — SIGNIFICANT CHANGE UP (ref 6–8.3)
PROTHROM AB SERPL-ACNC: 12.8 SEC — SIGNIFICANT CHANGE UP (ref 10–12.9)
RBC # BLD: 3 M/UL — LOW (ref 4.2–5.8)
RBC # BLD: 3.07 M/UL — LOW (ref 4.2–5.8)
RBC # BLD: 3.72 M/UL — LOW (ref 4.2–5.8)
RBC # FLD: 13 % — SIGNIFICANT CHANGE UP (ref 10.3–14.5)
RBC # FLD: 13.2 % — SIGNIFICANT CHANGE UP (ref 10.3–14.5)
RBC # FLD: 13.2 % — SIGNIFICANT CHANGE UP (ref 10.3–14.5)
SODIUM SERPL-SCNC: 138 MMOL/L — SIGNIFICANT CHANGE UP (ref 135–145)
SODIUM SERPL-SCNC: 141 MMOL/L — SIGNIFICANT CHANGE UP (ref 135–145)
TOTAL CHOLESTEROL/HDL RATIO MEASUREMENT: 3.1 RATIO — LOW (ref 3.4–9.6)
TRIGL SERPL-MCNC: 120 MG/DL — SIGNIFICANT CHANGE UP (ref 10–149)
TSH SERPL-MCNC: 1.12 UU/ML — SIGNIFICANT CHANGE UP (ref 0.34–4.82)
VIT B12 SERPL-MCNC: 566 PG/ML — SIGNIFICANT CHANGE UP (ref 232–1245)
WBC # BLD: 11.81 K/UL — HIGH (ref 3.8–10.5)
WBC # BLD: 13.29 K/UL — HIGH (ref 3.8–10.5)
WBC # BLD: 14.89 K/UL — HIGH (ref 3.8–10.5)
WBC # FLD AUTO: 11.81 K/UL — HIGH (ref 3.8–10.5)
WBC # FLD AUTO: 13.29 K/UL — HIGH (ref 3.8–10.5)
WBC # FLD AUTO: 14.89 K/UL — HIGH (ref 3.8–10.5)

## 2019-12-06 PROCEDURE — 88312 SPECIAL STAINS GROUP 1: CPT | Mod: 26

## 2019-12-06 PROCEDURE — 88305 TISSUE EXAM BY PATHOLOGIST: CPT | Mod: 26

## 2019-12-06 PROCEDURE — 74174 CTA ABD&PLVS W/CONTRAST: CPT | Mod: 26

## 2019-12-06 PROCEDURE — 99284 EMERGENCY DEPT VISIT MOD MDM: CPT

## 2019-12-06 RX ORDER — ONDANSETRON 8 MG/1
8 TABLET, FILM COATED ORAL ONCE
Refills: 0 | Status: COMPLETED | OUTPATIENT
Start: 2019-12-06 | End: 2019-12-06

## 2019-12-06 RX ORDER — ATORVASTATIN CALCIUM 80 MG/1
1 TABLET, FILM COATED ORAL
Qty: 0 | Refills: 0 | DISCHARGE

## 2019-12-06 RX ORDER — PANTOPRAZOLE SODIUM 20 MG/1
80 TABLET, DELAYED RELEASE ORAL ONCE
Refills: 0 | Status: COMPLETED | OUTPATIENT
Start: 2019-12-06 | End: 2019-12-06

## 2019-12-06 RX ORDER — PANTOPRAZOLE SODIUM 20 MG/1
8 TABLET, DELAYED RELEASE ORAL
Qty: 80 | Refills: 0 | Status: DISCONTINUED | OUTPATIENT
Start: 2019-12-06 | End: 2019-12-09

## 2019-12-06 RX ORDER — SODIUM CHLORIDE 9 MG/ML
1000 INJECTION INTRAMUSCULAR; INTRAVENOUS; SUBCUTANEOUS ONCE
Refills: 0 | Status: COMPLETED | OUTPATIENT
Start: 2019-12-06 | End: 2019-12-06

## 2019-12-06 RX ORDER — ATORVASTATIN CALCIUM 80 MG/1
10 TABLET, FILM COATED ORAL AT BEDTIME
Refills: 0 | Status: DISCONTINUED | OUTPATIENT
Start: 2019-12-06 | End: 2019-12-09

## 2019-12-06 RX ORDER — SODIUM CHLORIDE 9 MG/ML
1000 INJECTION, SOLUTION INTRAVENOUS
Refills: 0 | Status: DISCONTINUED | OUTPATIENT
Start: 2019-12-06 | End: 2019-12-09

## 2019-12-06 RX ORDER — TAMSULOSIN HYDROCHLORIDE 0.4 MG/1
0.4 CAPSULE ORAL ONCE
Refills: 0 | Status: COMPLETED | OUTPATIENT
Start: 2019-12-06 | End: 2019-12-06

## 2019-12-06 RX ORDER — TAMSULOSIN HYDROCHLORIDE 0.4 MG/1
0.4 CAPSULE ORAL AT BEDTIME
Refills: 0 | Status: DISCONTINUED | OUTPATIENT
Start: 2019-12-06 | End: 2019-12-09

## 2019-12-06 RX ORDER — LANOLIN ALCOHOL/MO/W.PET/CERES
3 CREAM (GRAM) TOPICAL AT BEDTIME
Refills: 0 | Status: COMPLETED | OUTPATIENT
Start: 2019-12-06 | End: 2019-12-06

## 2019-12-06 RX ADMIN — ATORVASTATIN CALCIUM 10 MILLIGRAM(S): 80 TABLET, FILM COATED ORAL at 21:31

## 2019-12-06 RX ADMIN — ONDANSETRON 8 MILLIGRAM(S): 8 TABLET, FILM COATED ORAL at 02:18

## 2019-12-06 RX ADMIN — PANTOPRAZOLE SODIUM 10 MG/HR: 20 TABLET, DELAYED RELEASE ORAL at 02:32

## 2019-12-06 RX ADMIN — PANTOPRAZOLE SODIUM 10 MG/HR: 20 TABLET, DELAYED RELEASE ORAL at 20:41

## 2019-12-06 RX ADMIN — TAMSULOSIN HYDROCHLORIDE 0.4 MILLIGRAM(S): 0.4 CAPSULE ORAL at 14:28

## 2019-12-06 RX ADMIN — SODIUM CHLORIDE 1000 MILLILITER(S): 9 INJECTION INTRAMUSCULAR; INTRAVENOUS; SUBCUTANEOUS at 02:19

## 2019-12-06 RX ADMIN — Medication 3 MILLIGRAM(S): at 23:08

## 2019-12-06 RX ADMIN — PANTOPRAZOLE SODIUM 10 MG/HR: 20 TABLET, DELAYED RELEASE ORAL at 12:25

## 2019-12-06 RX ADMIN — PANTOPRAZOLE SODIUM 80 MILLIGRAM(S): 20 TABLET, DELAYED RELEASE ORAL at 02:18

## 2019-12-06 RX ADMIN — SODIUM CHLORIDE 100 MILLILITER(S): 9 INJECTION, SOLUTION INTRAVENOUS at 06:45

## 2019-12-06 NOTE — H&P ADULT - NSHPPHYSICALEXAM_GEN_ALL_CORE
Vital Signs Last 24 Hrs  T(C): 36.7 (06 Dec 2019 05:50), Max: 37.7 (06 Dec 2019 01:40)  T(F): 98 (06 Dec 2019 05:50), Max: 99.9 (06 Dec 2019 01:40)  HR: 110 (06 Dec 2019 05:50) (108 - 121)  BP: 132/88 (06 Dec 2019 05:50) (122/83 - 132/88)  RR: 16 (06 Dec 2019 05:50) (16 - 20)  SpO2: 97% (06 Dec 2019 05:50) (97% - 100%)    PHYSICAL EXAM:  GENERAL: NAD, well-developed  HEAD:  Atraumatic, Normocephalic  EYES: EOMI, PERRLA, conjunctiva and sclera clear  NECK: Supple, No JVD  CHEST/LUNG: Clear to auscultation bilaterally; No wheeze  HEART: tachycardia; No murmurs, rubs, or gallops  ABDOMEN: Soft, Nontender, Nondistended; Bowel sounds present  EXTREMITIES:, No clubbing, cyanosis, or edema  PSYCH: AAOx3  NEUROLOGY: non-focal  SKIN: No rashes or lesions

## 2019-12-06 NOTE — H&P ADULT - ASSESSMENT
Patient is a 64M with gastritis, BPH, HLD, diverticulitis s/p sigmoidectomy, PMH of seizure (not on meds, last seizure over 10yrs ago), presented to ER for 4 day history of diarrhea with black watery stools, nausea and vomiting dark  material.   In ER, he was found to have tachycardia to 122 (sinus tachycardia on EKG), occult posivit on labs with drop in hgb to 11.7 from previous reading of 18 1 yr ago (though looks hemoconcentrated), wbc count of 13.29 noted.

## 2019-12-06 NOTE — H&P ADULT - PROBLEM SELECTOR PLAN 1
Presented with dark stools, diarrhea, vomiting, lower abdominal pain with recent history of outside food exposure  Concern for gi bleed given occult positive and noted drop in hgb  -on ppi infusion  -npo for now  -will also r/o GE, f/u stool studies  -f/u ct abdomen  -gi eval  -cbc q6hrs for 24hrs Presented with dark stools, diarrhea, vomiting, lower abdominal pain with recent history of outside food exposure  Concern for gi bleed given occult positive and noted drop in hgb  -on ppi infusion  -npo for now  -will also r/o GE, f/u stool studies  -f/u ct abdomen  -gi Dr Minor consulted  -cbc q6hrs for 24hrs

## 2019-12-06 NOTE — H&P ADULT - HISTORY OF PRESENT ILLNESS
Patient is a 64M with gastritis, BPH, HLD, diverticulitis s/p sigmoidectomy, PMH of seizure (not on meds, last seizure over 10yrs ago), presented to ER for 4 day history of diarrhea with black watery stools, multiple episodes per day, foul smelling, nausea and multiple episodes of black colored vomitus. Patient reports non radiating LLQ abdominal pain, more like an annoying sensation. He denies fevers but endorses chills and generalized weakness. Denies shortness of breath, chest pain, palpitations, lightheadedness, NSAID use or recent travel. History of eating outside food on the day the symptoms started, reports he was at a party and ate ham and turkey.

## 2019-12-06 NOTE — ED PROVIDER NOTE - CLINICAL SUMMARY MEDICAL DECISION MAKING FREE TEXT BOX
64 year old male with N/V/D and melena. tachycardic. PE as above.  labs, protonix, zofran, PPI 64 year old male with N/V/D and melena. tachycardic. PE as above.  labs, protonix, zofran, PPI  labs are significant for decreased hgb compared to prior. feeld improved with zofran/fluids. on protonix drip. will admit for GI bleed.

## 2019-12-06 NOTE — ED PROVIDER NOTE - OBJECTIVE STATEMENT
64 year old male PMH diverticulitis s/p partial colectomy, HTN, seizure coming in with 2 days of N/V/D and today developed black stools. states vomiting also appears black colored as well. never had this before. also complaints of generalized weakness. Denies cp, sob, palpitations, back pains, urinary complaints. no hx gi bleeding, GI ulcers.

## 2019-12-06 NOTE — CONSULT NOTE ADULT - ASSESSMENT
63 y/o M PMH Gastritis, Ronny's Esophagus, BPH, HLD, Seizure (last seizure > 10 years ago), and Diverticulitis s/p Sigmoidectomy p/w dark colored vomitus and diarrhea x 3 days - continuos no prior Hx, no NSAID/ASA use, and last EGD 3 years ago for Ronny's surveillance. Last colonoscopy incomplete 2/2 poor bowel prep. Patient's vitals concerning for low grade tachycardia but normotensive and no further episodes during hospital stay. Imaging included CTA A/P showed no CT evidence of a brisk gastrointestinal hemorrhage but noted distal esophageal and mid gastric body and gastric antrum thickening and apparent enhancement.

## 2019-12-06 NOTE — H&P ADULT - NSICDXPASTMEDICALHX_GEN_ALL_CORE_FT
PAST MEDICAL HISTORY:  BPH (benign prostatic hyperplasia)     Diverticulitis     Gastritis     Hyperlipidemia     Seizure

## 2019-12-06 NOTE — CONSULT NOTE ADULT - ATTENDING COMMENTS
I was physically present for the key portions of the evaluation and management (E/M) service provided.  The patient was personally seen and examined at bedside.          Very stable   Last colonoscopy was three years ago told to repeat  No longer having black stools   Clears Sunday   NPO post mN for Colon / EGD Monday (booked)  Thank you for your consultation and allowing  me to participate in the care of your patients. If you have further questions please contact me at [phone].     Kenny Mckeon M.D.       _________________________________________________________________________________________________  [facility]  [address] [place] [address]  Office: [phone]    Alex Lindsey PA-C  ___________________________________________________________________________________________________

## 2019-12-06 NOTE — CONSULT NOTE ADULT - SUBJECTIVE AND OBJECTIVE BOX
63 y/o M PMH Gastritis, Ronny's Esophagus, BPH, HLD, Seizure (last seizure > 10 years ago), and Diverticulitis s/p Sigmoidectomy, eizure (not on meds, last seizure over 10yrs ago), presented to ER for 4 day history of diarrhea with black watery stools, multiple episodes per day, foul smelling, nausea and multiple episodes of black colored vomitus. Patient reports non radiating LLQ abdominal pain, more like an annoying sensation. He denies fevers but endorses chills and generalized weakness. Denies shortness of breath, chest pain, palpitations, lightheadedness, NSAID use or recent travel. History of eating outside food on the day the symptoms started, reports he was at a party and ate ham and turkey. (06 Dec 2019 05:48)   .     REVIEW OF SYSTEMS  Constitutional:   No fever, no fatigue, no pallor, no night sweats, no weight loss.  HEENT:   No eye pain, no vision changes, no icterus, no mouth ulcers.  Respiratory:   No shortness of breath, no cough, no respiratory distress.   Cardiovascular:   No chest pain, no palpitations.   Gastrointestinal: No abdominal pain, no nausea, no vomiting , no diahrrea, no constipation, no hematochezia,no melena.  Skin:   No rashes, no jaundice, no eczema.   Musculoskeletal:   No joint pain, no swelling, no myalgia.   Neurologic:   No headache, no seizure, no weakness.   Genitourinary:   No dysuria, no decreased urine output.  Psychiatric:  No depression, no anxiety,   Endocrine:   No thyroid disease, no diabetes.  Heme/Lymphatic:   No anemia, no blood transfusions, no lymph node enlargement, no bleeding, no bruising.  ___________________________________________________________________________________________  Allergies    aspirin (Hives)    Intolerances      MEDICATIONS  (STANDING):  atorvastatin 10 milliGRAM(s) Oral at bedtime  lactated ringers. 1000 milliLiter(s) (100 mL/Hr) IV Continuous <Continuous>  pantoprazole Infusion 8 mG/Hr (10 mL/Hr) IV Continuous <Continuous>  tamsulosin 0.4 milliGRAM(s) Oral at bedtime    MEDICATIONS  (PRN):      PAST MEDICAL & SURGICAL HISTORY:  Gastritis  BPH (benign prostatic hyperplasia)  Hyperlipidemia  Seizure  Diverticulitis  History of appendectomy  History of open sigmoidectomy    FAMILY HISTORY:  No pertinent family history in first degree relatives    Social History: No hsitory of : Tobacco use, IVDA, EToH  ______________________________________________________________________________________    PHYSICAL EXAM    Daily Height in cm: 162.56 (06 Dec 2019 01:33)    Daily   BMI:   Change in Weight:  Vital Signs Last 24 Hrs  T(C): 36.7 (06 Dec 2019 08:37), Max: 37.7 (06 Dec 2019 01:40)  T(F): 98.1 (06 Dec 2019 08:37), Max: 99.9 (06 Dec 2019 01:40)  HR: 101 (06 Dec 2019 08:37) (101 - 121)  BP: 120/71 (06 Dec 2019 08:37) (113/73 - 132/88)  BP(mean): --  RR: 16 (06 Dec 2019 08:37) (16 - 20)  SpO2: 97% (06 Dec 2019 08:37) (97% - 100%)    General:  Well developed, well nourished, alert and active, no pallor, NAD.  HEENT:    Normal appearance of conjunctiva, ears, nose, lips, oropharynx, and oral mucosa, anicteric.  Neck:  No masses, no asymmetry.  Lymph Nodes:  No lymphadenopathy.   Cardiovascular:  RRR normal S1/S2, no murmur.  Respiratory:  CTA B/L, normal respiratory effort.   Abdominal:   soft, no masses or tenderness, normoactive BS, NT/ND, no HSM.  Extremities:   No clubbing or cyanosis, normal capillary refill, no edema.   Skin:   No rash, jaundice, lesions, eczema.   Musculoskeletal:  No joint swelling, erythema or tenderness.   Neuro: No focal deficits.   Other:   _______________________________________________________________________________________________  Lab Results:                          11.7   13.29 )-----------( 326      ( 06 Dec 2019 02:19 )             34.0     12-06    138  |  104  |  21<H>  ----------------------------<  147<H>  3.6   |  26  |  0.84    Ca    9.0      06 Dec 2019 02:19    TPro  7.1  /  Alb  3.5  /  TBili  0.4  /  DBili  0.1  /  AST  17  /  ALT  29  /  AlkPhos  39<L>  12-06    LIVER FUNCTIONS - ( 06 Dec 2019 02:19 )  Alb: 3.5 g/dL / Pro: 7.1 g/dL / ALK PHOS: 39 U/L / ALT: 29 U/L DA / AST: 17 U/L / GGT: x           PT/INR - ( 06 Dec 2019 02:19 )   PT: 12.8 sec;   INR: 1.15 ratio         PTT - ( 06 Dec 2019 02:19 )  PTT:25.0 sec        Stool Results:          RADIOLOGY RESULTS:    SURGICAL PATHOLOGY: 63 y/o M PMH Gastritis, Ronny's Esophagus, BPH, HLD, Seizure (last seizure > 10 years ago), and Diverticulitis s/p Sigmoidectomy p/w dark colored vomitus and diarrhea x 3 days - continuos no prior Hx, no NSAID/ASA use, and last EGD 3 years ago for Ronny's surveillance. Last colonoscopy incomplete 2/2 poor bowel prep. Patient's vitals concerning for low grade tachycardia but normotensive and no further episodes during hospital stay. Imaging included CTA A/P showed no CT evidence of a brisk gastrointestinal hemorrhage but noted distal esophageal and mid gastric body and gastric antrum thickening and apparent enhancement.     REVIEW OF SYSTEMS  Constitutional:   No fever, no fatigue, no pallor, no night sweats, no weight loss.  HEENT:   No eye pain, no vision changes, no icterus, no mouth ulcers.  Respiratory:   No shortness of breath, no cough, no respiratory distress.   Cardiovascular:   No chest pain, no palpitations.   Gastrointestinal: + abdominal pain, nausea, vomiting, diarrhea and melena.  Skin:   No rashes, no jaundice, no eczema.   Musculoskeletal:   No joint pain, no swelling, no myalgia.   Neurologic:   No headache, no seizure, no weakness.   Genitourinary:   No dysuria, no decreased urine output.  Psychiatric:  No depression, no anxiety,   Endocrine:   No thyroid disease, no diabetes.  Heme/Lymphatic:   No anemia, no blood transfusions, no lymph node enlargement, no bleeding, no bruising.  ___________________________________________________________________________________________  Allergies aspirin (Hives), no intolerances    MEDICATIONS  (STANDING):  atorvastatin 10 milliGRAM(s) Oral at bedtime  lactated ringers. 1000 milliLiter(s) (100 mL/Hr) IV Continuous <Continuous>  pantoprazole Infusion 8 mG/Hr (10 mL/Hr) IV Continuous <Continuous>  tamsulosin 0.4 milliGRAM(s) Oral at bedtime    PAST MEDICAL & SURGICAL HISTORY:  Gastritis  BPH (benign prostatic hyperplasia)  Hyperlipidemia  Seizure  Diverticulitis  History of appendectomy  History of open sigmoidectomy    FAMILY HISTORY:  No pertinent family history in first degree relatives of CRC    Social History: No history of Tobacco use, IVDA, EToH  ______________________________________________________________________________________    PHYSICAL EXAM  T(C): 36.7 (06 Dec 2019 08:37), Max: 37.7 (06 Dec 2019 01:40)  T(F): 98.1 (06 Dec 2019 08:37), Max: 99.9 (06 Dec 2019 01:40)  HR: 101 (06 Dec 2019 08:37) (101 - 121)  BP: 120/71 (06 Dec 2019 08:37) (113/73 - 132/88)  RR: 16 (06 Dec 2019 08:37) (16 - 20)  SpO2: 97% (06 Dec 2019 08:37) (97% - 100%)    General:  Well developed, well nourished, alert and active, no pallor, NAD.  HEENT:    Normal appearance of conjunctiva, ears, nose, lips, oropharynx, and oral mucosa, anicteric.  Neck:  No masses, no asymmetry.  Lymph Nodes:  No lymphadenopathy.   Cardiovascular:  RRR normal S1/S2, no murmur.  Respiratory:  CTA B/L, normal respiratory effort.   Abdominal:   soft, no masses or tenderness, normoactive BS, NT/ND, no HSM.  Extremities:   No clubbing or cyanosis, normal capillary refill, no edema.   Skin:   No rash, jaundice, lesions, eczema.   Musculoskeletal:  No joint swelling, erythema or tenderness.   Neuro: No focal deficits.   Other: 2 IVs in place, B/L extremities  _______________________________________________________________________________________________  Lab Results:                        11.7   13.29 )-----------( 326      ( 06 Dec 2019 02:19 )             34.0     12-06    138  |  104  |  21<H>  ----------------------------<  147<H>  3.6   |  26  |  0.84    Ca    9.0      06 Dec 2019 02:19    TPro  7.1  /  Alb  3.5  /  TBili  0.4  /  DBili  0.1  /  AST  17  /  ALT  29  /  AlkPhos  39<L>  12-06    LIVER FUNCTIONS - ( 06 Dec 2019 02:19 )  Alb: 3.5 g/dL / Pro: 7.1 g/dL / ALK PHOS: 39 U/L / ALT: 29 U/L DA / AST: 17 U/L / GGT: x           PT/INR - ( 06 Dec 2019 02:19 )   PT: 12.8 sec;   INR: 1.15 ratio     PTT - ( 06 Dec 2019 02:19 )  PTT:25.0 sec    Stool Results: Pending    RADIOLOGY RESULTS:   CT Angio Abdomen and Pelvis w/ IV Cont (12.06.19 @ 06:52) >  IMPRESSION:   1. No CT evidence of a brisk gastrointestinal hemorrhage.   2. Distal esophageal and mid gastric body and gastric antrum thickening   and apparent enhancement.   Please correlate for potential esophagitis/gastritis or abnormal masslike   soft   tissue thickening.   3. Prostatomegaly.    SURGICAL PATHOLOGY: N/A

## 2019-12-06 NOTE — ED ADULT NURSE NOTE - NSIMPLEMENTINTERV_GEN_ALL_ED
Implemented All Universal Safety Interventions:  [place] to call system. Call bell, personal items and telephone within reach. Instruct patient to call for assistance. Room bathroom lighting operational. Non-slip footwear when patient is off stretcher. Physically safe environment: no spills, clutter or unnecessary equipment. Stretcher in lowest position, wheels locked, appropriate side rails in place.

## 2019-12-07 LAB
ANION GAP SERPL CALC-SCNC: 4 MMOL/L — LOW (ref 5–17)
BUN SERPL-MCNC: 8 MG/DL — SIGNIFICANT CHANGE UP (ref 7–18)
CALCIUM SERPL-MCNC: 8.7 MG/DL — SIGNIFICANT CHANGE UP (ref 8.4–10.5)
CHLORIDE SERPL-SCNC: 106 MMOL/L — SIGNIFICANT CHANGE UP (ref 96–108)
CO2 SERPL-SCNC: 29 MMOL/L — SIGNIFICANT CHANGE UP (ref 22–31)
CREAT SERPL-MCNC: 0.76 MG/DL — SIGNIFICANT CHANGE UP (ref 0.5–1.3)
GLUCOSE SERPL-MCNC: 111 MG/DL — HIGH (ref 70–99)
HCT VFR BLD CALC: 31.8 % — LOW (ref 39–50)
HCV AB S/CO SERPL IA: 0.32 S/CO — SIGNIFICANT CHANGE UP (ref 0–0.99)
HCV AB SERPL-IMP: SIGNIFICANT CHANGE UP
HGB BLD-MCNC: 10.6 G/DL — LOW (ref 13–17)
MCHC RBC-ENTMCNC: 31.4 PG — SIGNIFICANT CHANGE UP (ref 27–34)
MCHC RBC-ENTMCNC: 33.3 GM/DL — SIGNIFICANT CHANGE UP (ref 32–36)
MCV RBC AUTO: 94.1 FL — SIGNIFICANT CHANGE UP (ref 80–100)
NRBC # BLD: 0 /100 WBCS — SIGNIFICANT CHANGE UP (ref 0–0)
PLATELET # BLD AUTO: 300 K/UL — SIGNIFICANT CHANGE UP (ref 150–400)
POTASSIUM SERPL-MCNC: 3.8 MMOL/L — SIGNIFICANT CHANGE UP (ref 3.5–5.3)
POTASSIUM SERPL-SCNC: 3.8 MMOL/L — SIGNIFICANT CHANGE UP (ref 3.5–5.3)
RBC # BLD: 3.38 M/UL — LOW (ref 4.2–5.8)
RBC # FLD: 13.2 % — SIGNIFICANT CHANGE UP (ref 10.3–14.5)
SODIUM SERPL-SCNC: 139 MMOL/L — SIGNIFICANT CHANGE UP (ref 135–145)
WBC # BLD: 14.57 K/UL — HIGH (ref 3.8–10.5)
WBC # FLD AUTO: 14.57 K/UL — HIGH (ref 3.8–10.5)

## 2019-12-07 RX ORDER — LANOLIN ALCOHOL/MO/W.PET/CERES
1 CREAM (GRAM) TOPICAL AT BEDTIME
Refills: 0 | Status: DISCONTINUED | OUTPATIENT
Start: 2019-12-07 | End: 2019-12-09

## 2019-12-07 RX ADMIN — PANTOPRAZOLE SODIUM 10 MG/HR: 20 TABLET, DELAYED RELEASE ORAL at 18:53

## 2019-12-07 RX ADMIN — ATORVASTATIN CALCIUM 10 MILLIGRAM(S): 80 TABLET, FILM COATED ORAL at 21:43

## 2019-12-07 RX ADMIN — TAMSULOSIN HYDROCHLORIDE 0.4 MILLIGRAM(S): 0.4 CAPSULE ORAL at 21:43

## 2019-12-07 RX ADMIN — PANTOPRAZOLE SODIUM 10 MG/HR: 20 TABLET, DELAYED RELEASE ORAL at 06:35

## 2019-12-07 RX ADMIN — Medication 1 MILLIGRAM(S): at 21:43

## 2019-12-07 NOTE — PROGRESS NOTE ADULT - SUBJECTIVE AND OBJECTIVE BOX
PRESENTING CC:    SUBJ:       PMH -reviewed admission note, no change since admission  Heart failure: acute [ ] chronic [ ] acute or chronic [ ] diastolic [ ] systolic [ ] combined systolic and diastolic[ ]  BERTIN: ATN[ ] renal medullary necrosis [ ] CKD I [ ]CKDII [ ]CKD III [ ]CKD IV [ ]CKD V [ ]Other pathological lesions [ ]    MEDICATIONS  (STANDING):  atorvastatin 10 milliGRAM(s) Oral at bedtime  lactated ringers. 1000 milliLiter(s) (100 mL/Hr) IV Continuous <Continuous>  pantoprazole Infusion 8 mG/Hr (10 mL/Hr) IV Continuous <Continuous>  tamsulosin 0.4 milliGRAM(s) Oral at bedtime    MEDICATIONS  (PRN):          FAMILY HISTORY:  No pertinent family history in first degree relatives    No family history of premature coronary artery disease or sudden cardiac death      REVIEW OF SYSTEMS:  Constitutional: [ ] fever, [ ]weight loss,  [ ]fatigue  Eyes: [ ] visual changes  Respiratory: [ ]shortness of breath;  [ ] cough, [ ]wheezing, [ ]chills, [ ]hemoptysis  Cardiovascular: [ ] chest pain, [ ]palpitations, [ ]dizziness,  [ ]leg swelling[ ]orthopnea[ ]PND  Gastrointestinal: [ ] abdominal pain, [ ]nausea, [ ]vomiting,  [ ]diarrhea   Genitourinary: [ ] dysuria, [ ] hematuria  Neurologic: [ ] headaches [ ] tremors[ ]weakness  Skin: [ ] itching, [ ]burning, [ ] rashes  Endocrine: [ ] heat or cold intolerance  Musculoskeletal: [ ] joint pain or swelling; [ ] muscle, back, or extremity pain  Psychiatric: [ ] depression, [ ]anxiety, [ ]mood swings, or [ ]difficulty sleeping  Hematologic: [ ] easy bruising, [ ] bleeding gums    [x] All remaining systems negative except as per above.   [ ]Unable to obtain.    Vital Signs Last 24 Hrs  T(C): 36.7 (07 Dec 2019 16:19), Max: 36.8 (06 Dec 2019 23:56)  T(F): 98 (07 Dec 2019 16:19), Max: 98.2 (06 Dec 2019 23:56)  HR: 94 (07 Dec 2019 16:19) (94 - 103)  BP: 130/81 (07 Dec 2019 16:19) (102/61 - 130/81)  BP(mean): --  RR: 16 (07 Dec 2019 16:19) (15 - 17)  SpO2: 94% (07 Dec 2019 16:19) (94% - 98%)  I&O's Summary      PHYSICAL EXAM:  General: No acute distress BMI-  HEENT: EOMI, PERRL  Neck: Supple, [ ] JVD  Lungs: Equal air entry bilaterally; [ ] rales [ ] wheezing [ ] rhonchi  Heart: Regular rate and rhythm; [ ] murmur   /6 [ ] systolic [ ] diastolic [ ] radiation[ ] rubs [ ]  gallops  Abdomen: Nontender, bowel sounds present  Extremities: No clubbing, cyanosis, [ ] edema  Nervous system:  Alert & Oriented X3, no focal deficits  Psychiatric: Normal affect  Skin: No rashes or lesions    LABS:  12-07    139  |  106  |  8   ----------------------------<  111<H>  3.8   |  29  |  0.76    Ca    8.7      07 Dec 2019 06:27    TPro  5.8<L>  /  Alb  2.9<L>  /  TBili  0.4  /  DBili  x   /  AST  15  /  ALT  24  /  AlkPhos  33<L>  12-06    Creatinine Trend: 0.76<--, 0.62<--, 0.84<--                        10.6   14.57 )-----------( 300      ( 07 Dec 2019 06:27 )             31.8     PT/INR - ( 06 Dec 2019 02:19 )   PT: 12.8 sec;   INR: 1.15 ratio         PTT - ( 06 Dec 2019 02:19 )  PTT:25.0 sec  Lipid Panel:   Cardiac Enzymes:           RADIOLOGY:    ECG [my interpretation]:    TELEMETRY:    ECHO:    STRESS TEST:    CATHETERIZATION:      IMPRESSION AND PLAN:

## 2019-12-07 NOTE — PROGRESS NOTE ADULT - PROVIDER SPECIALTY LIST ADULT
Internal Medicine Xerosis Normal Treatment: I recommended application of Cetaphil or CeraVe numerous times a day going to bed to all dry areas.

## 2019-12-08 LAB
ANION GAP SERPL CALC-SCNC: 6 MMOL/L — SIGNIFICANT CHANGE UP (ref 5–17)
BUN SERPL-MCNC: 9 MG/DL — SIGNIFICANT CHANGE UP (ref 7–18)
CALCIUM SERPL-MCNC: 8.7 MG/DL — SIGNIFICANT CHANGE UP (ref 8.4–10.5)
CHLORIDE SERPL-SCNC: 105 MMOL/L — SIGNIFICANT CHANGE UP (ref 96–108)
CO2 SERPL-SCNC: 27 MMOL/L — SIGNIFICANT CHANGE UP (ref 22–31)
CREAT SERPL-MCNC: 0.74 MG/DL — SIGNIFICANT CHANGE UP (ref 0.5–1.3)
GLUCOSE SERPL-MCNC: 101 MG/DL — HIGH (ref 70–99)
HCT VFR BLD CALC: 29.8 % — LOW (ref 39–50)
HGB BLD-MCNC: 9.9 G/DL — LOW (ref 13–17)
MCHC RBC-ENTMCNC: 30.7 PG — SIGNIFICANT CHANGE UP (ref 27–34)
MCHC RBC-ENTMCNC: 33.2 GM/DL — SIGNIFICANT CHANGE UP (ref 32–36)
MCV RBC AUTO: 92.5 FL — SIGNIFICANT CHANGE UP (ref 80–100)
NRBC # BLD: 0 /100 WBCS — SIGNIFICANT CHANGE UP (ref 0–0)
PLATELET # BLD AUTO: 300 K/UL — SIGNIFICANT CHANGE UP (ref 150–400)
POTASSIUM SERPL-MCNC: 3.9 MMOL/L — SIGNIFICANT CHANGE UP (ref 3.5–5.3)
POTASSIUM SERPL-SCNC: 3.9 MMOL/L — SIGNIFICANT CHANGE UP (ref 3.5–5.3)
RBC # BLD: 3.22 M/UL — LOW (ref 4.2–5.8)
RBC # FLD: 13.1 % — SIGNIFICANT CHANGE UP (ref 10.3–14.5)
SODIUM SERPL-SCNC: 138 MMOL/L — SIGNIFICANT CHANGE UP (ref 135–145)
WBC # BLD: 11.2 K/UL — HIGH (ref 3.8–10.5)
WBC # FLD AUTO: 11.2 K/UL — HIGH (ref 3.8–10.5)

## 2019-12-08 RX ORDER — SOD SULF/SODIUM/NAHCO3/KCL/PEG
4000 SOLUTION, RECONSTITUTED, ORAL ORAL ONCE
Refills: 0 | Status: COMPLETED | OUTPATIENT
Start: 2019-12-08 | End: 2019-12-08

## 2019-12-08 RX ORDER — MULTIVIT WITH MIN/MFOLATE/K2 340-15/3 G
1 POWDER (GRAM) ORAL ONCE
Refills: 0 | Status: COMPLETED | OUTPATIENT
Start: 2019-12-08 | End: 2019-12-08

## 2019-12-08 RX ADMIN — ATORVASTATIN CALCIUM 10 MILLIGRAM(S): 80 TABLET, FILM COATED ORAL at 21:11

## 2019-12-08 RX ADMIN — TAMSULOSIN HYDROCHLORIDE 0.4 MILLIGRAM(S): 0.4 CAPSULE ORAL at 21:10

## 2019-12-08 RX ADMIN — Medication 1 BOTTLE: at 17:30

## 2019-12-08 RX ADMIN — Medication 20 MILLIGRAM(S): at 21:10

## 2019-12-08 RX ADMIN — PANTOPRAZOLE SODIUM 10 MG/HR: 20 TABLET, DELAYED RELEASE ORAL at 13:54

## 2019-12-08 RX ADMIN — PANTOPRAZOLE SODIUM 10 MG/HR: 20 TABLET, DELAYED RELEASE ORAL at 23:30

## 2019-12-08 RX ADMIN — Medication 1 MILLIGRAM(S): at 21:10

## 2019-12-08 RX ADMIN — PANTOPRAZOLE SODIUM 10 MG/HR: 20 TABLET, DELAYED RELEASE ORAL at 04:03

## 2019-12-09 ENCOUNTER — TRANSCRIPTION ENCOUNTER (OUTPATIENT)
Age: 64
End: 2019-12-09

## 2019-12-09 VITALS
SYSTOLIC BLOOD PRESSURE: 122 MMHG | RESPIRATION RATE: 17 BRPM | DIASTOLIC BLOOD PRESSURE: 71 MMHG | OXYGEN SATURATION: 97 % | TEMPERATURE: 98 F | HEART RATE: 102 BPM

## 2019-12-09 LAB
ANION GAP SERPL CALC-SCNC: 7 MMOL/L — SIGNIFICANT CHANGE UP (ref 5–17)
BUN SERPL-MCNC: 11 MG/DL — SIGNIFICANT CHANGE UP (ref 7–18)
CALCIUM SERPL-MCNC: 8.7 MG/DL — SIGNIFICANT CHANGE UP (ref 8.4–10.5)
CHLORIDE SERPL-SCNC: 104 MMOL/L — SIGNIFICANT CHANGE UP (ref 96–108)
CO2 SERPL-SCNC: 26 MMOL/L — SIGNIFICANT CHANGE UP (ref 22–31)
CREAT SERPL-MCNC: 0.77 MG/DL — SIGNIFICANT CHANGE UP (ref 0.5–1.3)
GLUCOSE SERPL-MCNC: 105 MG/DL — HIGH (ref 70–99)
HCT VFR BLD CALC: 31.6 % — LOW (ref 39–50)
HGB BLD-MCNC: 10.7 G/DL — LOW (ref 13–17)
MCHC RBC-ENTMCNC: 31.2 PG — SIGNIFICANT CHANGE UP (ref 27–34)
MCHC RBC-ENTMCNC: 33.9 GM/DL — SIGNIFICANT CHANGE UP (ref 32–36)
MCV RBC AUTO: 92.1 FL — SIGNIFICANT CHANGE UP (ref 80–100)
NRBC # BLD: 0 /100 WBCS — SIGNIFICANT CHANGE UP (ref 0–0)
PLATELET # BLD AUTO: 353 K/UL — SIGNIFICANT CHANGE UP (ref 150–400)
POTASSIUM SERPL-MCNC: 3.9 MMOL/L — SIGNIFICANT CHANGE UP (ref 3.5–5.3)
POTASSIUM SERPL-SCNC: 3.9 MMOL/L — SIGNIFICANT CHANGE UP (ref 3.5–5.3)
RBC # BLD: 3.43 M/UL — LOW (ref 4.2–5.8)
RBC # FLD: 13 % — SIGNIFICANT CHANGE UP (ref 10.3–14.5)
SODIUM SERPL-SCNC: 137 MMOL/L — SIGNIFICANT CHANGE UP (ref 135–145)
WBC # BLD: 10.34 K/UL — SIGNIFICANT CHANGE UP (ref 3.8–10.5)
WBC # FLD AUTO: 10.34 K/UL — SIGNIFICANT CHANGE UP (ref 3.8–10.5)

## 2019-12-09 PROCEDURE — 88312 SPECIAL STAINS GROUP 1: CPT

## 2019-12-09 PROCEDURE — 85730 THROMBOPLASTIN TIME PARTIAL: CPT

## 2019-12-09 PROCEDURE — 86900 BLOOD TYPING SEROLOGIC ABO: CPT

## 2019-12-09 PROCEDURE — 96365 THER/PROPH/DIAG IV INF INIT: CPT

## 2019-12-09 PROCEDURE — 88305 TISSUE EXAM BY PATHOLOGIST: CPT

## 2019-12-09 PROCEDURE — 82306 VITAMIN D 25 HYDROXY: CPT

## 2019-12-09 PROCEDURE — 84443 ASSAY THYROID STIM HORMONE: CPT

## 2019-12-09 PROCEDURE — 80053 COMPREHEN METABOLIC PANEL: CPT

## 2019-12-09 PROCEDURE — 82272 OCCULT BLD FECES 1-3 TESTS: CPT

## 2019-12-09 PROCEDURE — 86850 RBC ANTIBODY SCREEN: CPT

## 2019-12-09 PROCEDURE — 82248 BILIRUBIN DIRECT: CPT

## 2019-12-09 PROCEDURE — 85027 COMPLETE CBC AUTOMATED: CPT

## 2019-12-09 PROCEDURE — 86901 BLOOD TYPING SEROLOGIC RH(D): CPT

## 2019-12-09 PROCEDURE — 96374 THER/PROPH/DIAG INJ IV PUSH: CPT | Mod: XU

## 2019-12-09 PROCEDURE — 83690 ASSAY OF LIPASE: CPT

## 2019-12-09 PROCEDURE — 99285 EMERGENCY DEPT VISIT HI MDM: CPT

## 2019-12-09 PROCEDURE — 93005 ELECTROCARDIOGRAM TRACING: CPT

## 2019-12-09 PROCEDURE — 82746 ASSAY OF FOLIC ACID SERUM: CPT

## 2019-12-09 PROCEDURE — 80061 LIPID PANEL: CPT

## 2019-12-09 PROCEDURE — 86803 HEPATITIS C AB TEST: CPT

## 2019-12-09 PROCEDURE — 82607 VITAMIN B-12: CPT

## 2019-12-09 PROCEDURE — 83036 HEMOGLOBIN GLYCOSYLATED A1C: CPT

## 2019-12-09 PROCEDURE — 80048 BASIC METABOLIC PNL TOTAL CA: CPT

## 2019-12-09 PROCEDURE — 36415 COLL VENOUS BLD VENIPUNCTURE: CPT

## 2019-12-09 PROCEDURE — 74174 CTA ABD&PLVS W/CONTRAST: CPT

## 2019-12-09 PROCEDURE — 96375 TX/PRO/DX INJ NEW DRUG ADDON: CPT

## 2019-12-09 PROCEDURE — 85610 PROTHROMBIN TIME: CPT

## 2019-12-09 RX ORDER — PANTOPRAZOLE SODIUM 20 MG/1
1 TABLET, DELAYED RELEASE ORAL
Qty: 30 | Refills: 0
Start: 2019-12-09 | End: 2020-01-07

## 2019-12-09 RX ORDER — OMEPRAZOLE 10 MG/1
1 CAPSULE, DELAYED RELEASE ORAL
Qty: 0 | Refills: 0 | DISCHARGE

## 2019-12-09 NOTE — CHART NOTE - NSCHARTNOTEFT_GEN_A_CORE
Esophagogastroduodenoscopy Report  Indication: GI bleed   Referring MD: Dr. Ferreira     Instrument:  # [id number]  Anesthesia: MAC  Consent:  Informed consent was obtained from the patient after providing any opportunity for questions  Procedure: The gastroscope was gently passed through the incisoral orifice into the oral cavity and under direct visualization the esophagus was intubated. The endoscope was passed down the esophagus, through the stomach and into proximal jejunum. Color, texture, mucosa and anatomy of the esophagus, stomach, and duodenum were carefully examined with the scope. The patient tolerated the procedure well. After completion of the examination, the patient was transferred to the recovery room.   Preparation: NPO   Findings:   Oropharynx	Normal  Esophagus	Normal.  EG-junction	6 cm hiatus hernia. At the GI junction a 7 mm clean based ulcer.   Cardia	Normal.  Body	Mild erythema and edema. Random biopsies taken [2]  Antrum	Mild erythema and edema. Random biopsies taken [2]  Pylorus	Normal.  Duodenal Bulb	Normal   2nd portion	Normal. Random biopsy taken [1]  3rd portion	Normal.  Date and time:12/9/2019 10:54:51 AM  EBL:0  Impression: 1- Large hiatus hernia 2- GE junction ulcer likely cause of GI bleed (nonmalignant appearing)    Plan: 1- Resume diet 2- PPI daily 3- Avoid NSAIDs 4- Follow up pathology 5- Repeat EGD in 2 months to reassess site and biopsy.        Procedure Start Time:  10:43 am  Procedure End Time:   10:47 am        Attending:       Kenny Minor M.D.   Date and Time: 12/9/2019 10:54:51 AM    ______________________________________________________       Colonoscopy Report  Indication: Gi bleed    Referring: Dr. Ferreira    Instrument:  [id number]  Anesthesia: MAC  Consent:  Informed consent was obtained from the patient after providing any opportunity for questions  Procedure: After placing the patient in the left lateral decubitus position, the colonoscope was gently inserted into the rectum and advanced to the cecum.  The Ti was intubated and examined. Color, texture, mucosa, and anatomy of the colon were carefully examined with the scope. The patient tolerated the procedure well. After completion of the exam, the patient was transferred to the recovery room.     Preparation: Magnesium Citrate X 2 Prep was excellent    Findings:   Anal Canal	Normal  Rectum	Normal  Sigmoid Colon 	Surgical anastomosis appeared intact without erythema or edema.   Descending Colon	Normal  Splenic Flexure	Normal  Transverse Colon	Normal  Hepatic Flexure	Normal  Ascending Colon	Normal  Cecum	Normal  Ileo-cecal Valve	Normal  Ileum 	Normal  Date and time: 12/9/2019 10:52 AM  EBL:0    Impression:  1- S.p partial left nik-colectomy 2-Otherwise unremarkable colonoscopy    Plan: 1-Resume diet 2- Repeat in 5 years   3- EGD today                   Procedure Start Time:  10:32 am  Cecum Reached Time: 10:34 am  Procedure End Time:   10:40 am  Total Withdrawal Time:  6 minutes                                 Attending:       Kenny Minor M.D.   Date and Time: 12/9/2019 10:52:34 AM

## 2019-12-09 NOTE — DISCHARGE NOTE PROVIDER - CARE PROVIDER_API CALL
Kenny Payan  [address]  [place]  [place] [address]  Phone: [phone]  Fax: (   )    -  Follow Up Time: 1 week

## 2019-12-09 NOTE — DISCHARGE NOTE PROVIDER - NSDCMRMEDTOKEN_GEN_ALL_CORE_FT
atorvastatin 10 mg oral tablet: 1 tab(s) orally once a day  Protonix 40 mg oral delayed release tablet: 1 tab(s) orally once a day   tamsulosin 0.4 mg oral capsule: 1 cap(s) orally once a day

## 2019-12-09 NOTE — DISCHARGE NOTE PROVIDER - HOSPITAL COURSE
64M with gastritis, BPH, HLD, diverticulitis s/p sigmoidectomy, PMH of seizure (not on meds, last seizure over 10yrs ago), presented to ER for 4 day history of diarrhea with black watery stools, multiple episodes per day, foul smelling, nausea and multiple episodes of black colored vomitus. Patient reports non radiating LLQ abdominal pain, more like an annoying sensation.      CTA A/P with contrast 12/6->    1. No CT evidence of a brisk gastrointestinal hemorrhage.     2. Distal esophageal and mid gastric body thickening and apparent     enhancement.     Please correlate for potential esophagitis/gastritis or abnormal masslike     soft     tissue thickening.     3. Prostatomegaly.        Pt. admitted to medicine for GIB, followed by GI Dr. Obrien.  Pt. placed on clear liquid diet, treated with Protonix gtt with no further episodes of GIB, H/H stable.    Pt. underwent EGD and colonoscopy 12/9

## 2019-12-09 NOTE — DISCHARGE NOTE NURSING/CASE MANAGEMENT/SOCIAL WORK - PATIENT PORTAL LINK FT
You can access the FollowMyHealth Patient Portal offered by [facility] by registering at the following website: [email or web address]. By joining [employer]’s FollowMyHealth portal, you will also be able to view your health information using other applications (apps) compatible with our system.

## 2019-12-09 NOTE — DISCHARGE NOTE PROVIDER - PROVIDER TOKENS
FREE:[LAST:[Tung],FIRST:[Kenny],PHONE:[phone]],FAX:[(   )    -],ADDRESS:[[address] [address]],FOLLOWUP:[1 week]]

## 2019-12-09 NOTE — DISCHARGE NOTE PROVIDER - NSDCCPCAREPLAN_GEN_ALL_CORE_FT
PRINCIPAL DISCHARGE DIAGNOSIS  Diagnosis: Gastrointestinal hemorrhage, unspecified gastrointestinal hemorrhage type  Assessment and Plan of Treatment: You were admitted with gastrointestinal beed.  You were followed by gastrointestinal doctor, started on clear liquid diet and treated with intravenous Protonix that inhibits gastric acid secretion.  You underwent EGD and colonoscopy 12/9.    Colonoscopy was unremarkable.  EGD showed gastric ulcer for which you should take oral Protonix and follow up with repeat EGD in 2 months.  Please follow up with your primary doctor and your gastroenterologist for continued follow up of your blood count and ulcer status.      SECONDARY DISCHARGE DIAGNOSES  Diagnosis: BPH (benign prostatic hyperplasia)  Assessment and Plan of Treatment: Continue Tamsulosin for your enlarged prostate.  Follow up with your primary doctor.

## 2019-12-09 NOTE — CHART NOTE - NSCHARTNOTEFT_GEN_A_CORE
EVENT:  63 y/o male with pmh of Gastritis, BPH, HLD, Diverticulitis s/p sigmoidectomy, Seizure (not on meds, last seizure over 10yrs ago), presented to ER for 4 day history of diarrhea with black watery stools, multiple episodes per day, foul smelling, nausea and multiple episodes of black colored vomitus. Patient reported non radiating LLQ abdominal pain, more like an annoying sensation. Patient was admitted for GI bleed. Occult blood positive. Patient is scheduled for EGD/Colonoscopy today, GI Dr. Minor is following. Patient refused to drink Golytely as reported by the nurse. I went to speak to patient and he states he does not want to drink it. Patient had Magnesium citrate and Dulcolax.  Patient is alert and oriented x3, seen and examined in NAD.    OBJECTIVE:  Vital Signs Last 24 Hrs  T(C): 36.7 (08 Dec 2019 23:36), Max: 37.2 (08 Dec 2019 08:14)  T(F): 98 (08 Dec 2019 23:36), Max: 98.9 (08 Dec 2019 08:14)  HR: 108 (08 Dec 2019 23:36) (102 - 108)  BP: 122/81 (08 Dec 2019 23:36) (120/75 - 123/89)  BP(mean): --  RR: 16 (08 Dec 2019 23:36) (16 - 17)  SpO2: 96% (08 Dec 2019 23:36) (95% - 97%)    LABS:                        9.9    11.20 )-----------( 300      ( 08 Dec 2019 07:07 )             29.8     12-08    138  |  105  |  9   ----------------------------<  101<H>  3.9   |  27  |  0.74    Ca    8.7      08 Dec 2019 07:07        ASSESSMENT  Problem: GIB  No report of melena or hematemesis since admission    Plan:  For EGD/Colonoscopy today  Continue NPO  Continue Protonix drip  Monitor CBC

## 2019-12-11 LAB — SURGICAL PATHOLOGY STUDY: SIGNIFICANT CHANGE UP

## 2020-01-16 NOTE — ED PROVIDER NOTE - CARDIAC, MLM
Contacted patient. He states both of his eyes are now red and he woke up crusted shut with eye discharge.    Patient has been taking antibiotics as directed and applying erythromycin ointment to both eyes. He states it doesn't seem that the ointment is working.    Discussed with Dr. Malcolm, he states there is another ointment that can be prescribed, he needs to look it up. We will contact patient with further recommendations once available. Advised patient to continue taking medications as directed for now.    Patient informed of above, verbalized understanding.   Normal rate, regular rhythm.  Heart sounds S1, S2.  No murmurs, rubs or gallops.

## 2020-04-07 PROBLEM — E78.5 HYPERLIPIDEMIA, UNSPECIFIED: Chronic | Status: ACTIVE | Noted: 2019-12-06

## 2020-04-07 PROBLEM — N40.0 BENIGN PROSTATIC HYPERPLASIA WITHOUT LOWER URINARY TRACT SYMPTOMS: Chronic | Status: ACTIVE | Noted: 2019-12-06

## 2020-04-07 PROBLEM — K29.70 GASTRITIS, UNSPECIFIED, WITHOUT BLEEDING: Chronic | Status: ACTIVE | Noted: 2019-12-06

## 2020-04-21 ENCOUNTER — APPOINTMENT (OUTPATIENT)
Dept: OTHER | Facility: CLINIC | Age: 65
End: 2020-04-21
Payer: COMMERCIAL

## 2020-04-21 PROCEDURE — 99396 PREV VISIT EST AGE 40-64: CPT

## 2020-04-21 NOTE — HEALTH RISK ASSESSMENT
[Patient reported colonoscopy was normal] : Patient reported colonoscopy was normal [ColonoscopyDate] : 2017

## 2020-04-21 NOTE — DISCUSSION/SUMMARY
[Patient seen for WTC Monitoring ___] : Patient was seen for WTC monitoring [unfilled] [Please See Note in Chart and Documentation in Trial DB] : Please see note in chart and documentation in Trial DB. [FreeTextEntry3] : HPI: 64 yr old white male evaluated for  [facility]  annual monitoring visit. \par Verbal consent given on the day of the encounter  by the patient himself.\par this encounter was conducted over  on the telephone during time of COVID 19 pandemic\par \par  He is treated for GERD,  BPH, HTN, and HLD. He's had GERD since 1990 and has Low's esophagus. \par \par PCP: Dr. 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 He worked every day for first few days then 3x week as it was part of his regular route. \par Occupational Hx: DOS\par PMH:See Allscripts and Trial DB\par PSH: See Allscripts and Trial DB \par Family History:heart disease and stomach  cancer \par Preventive Screening: \par Colonoscopy-2017 \par CXR-2019 \par Prostate exam-2018\par Allergies: ASA\par Meds: reviewed \par Social Hx: See Allscripts and Trial DB\par Smoking Status: non smoker  \par Review of Systems-IAMQ reviewed with patient\par \par PHYSICAL EXAM: to be performed at a later date during face to face encounter to complete WTC monitoring visit\par \par \par Spirometry: to be performed at a later date during face to face encounter to complete WTC monitoring visit\par \par \par Assessment and plan:\par [facility] annual  MV\par no new diagnosis or symptoms

## 2020-07-03 NOTE — ED ADULT NURSE NOTE - CAS TRG GENERAL NORM CIRC DET
Physical Therapy  Discussed d/c with pt and she states she has no need for PT eval or DME for home except she plans to try to obtain a shower chair.   Electronically signed by Lis Liao PT on 7/3/2020 at 8:49 AM Strong peripheral pulses

## 2021-05-19 ENCOUNTER — APPOINTMENT (OUTPATIENT)
Dept: OTHER | Facility: CLINIC | Age: 66
End: 2021-05-19
Payer: COMMERCIAL

## 2021-05-19 PROCEDURE — 99397 PER PM REEVAL EST PAT 65+ YR: CPT | Mod: 95

## 2021-05-19 NOTE — DISCUSSION/SUMMARY
[Home] : at home, [unfilled] , at the time of the visit. [Other Location: e.g. Home (Enter Location, City,State)___] : at [unfilled] [Verbal consent obtained from patient] : the patient, [unfilled] [Patient seen for WTC Monitoring ___] : Patient was seen for WTC monitoring [unfilled] [Please See Note in Chart and Documentation in Trial DB] : Please see note in chart and documentation in Trial DB. [FreeTextEntry3] : HPI: 65 yr old white male evaluated for  [facility]  annual monitoring visit. \par \par \par  He is treated for GERD,  BPH, HTN, and HLD. He's had GERD since 1990 and has Low's esophagus. \par last EGD 2019 \par PCP: Dr. 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 He worked every day for first few days then 3x week as it was part of his regular route. \par Occupational Hx: DOS\par PMH:See Allscripts and Trial DB\par PSH: See Allscripts and Trial DB \par Family History:heart disease and stomach  cancer \par Preventive Screening: \par Colonoscopy-2017 \par CXR-2019 \par Prostate exam-2018\par Allergies: ASA\par Meds: reviewed \par Social Hx: See Allscripts and Trial DB\par Smoking Status: non smoker  \par Review of Systems-IAMQ reviewed with patient\par \par \par Assessment and plan:\par [facility] annual  MV\par no new diagnosis or symptoms

## 2022-04-26 ENCOUNTER — APPOINTMENT (OUTPATIENT)
Dept: OTHER | Facility: CLINIC | Age: 67
End: 2022-04-26
Payer: COMMERCIAL

## 2022-04-26 VITALS
TEMPERATURE: 98.7 F | SYSTOLIC BLOOD PRESSURE: 125 MMHG | BODY MASS INDEX: 26.46 KG/M2 | HEIGHT: 64 IN | WEIGHT: 155 LBS | DIASTOLIC BLOOD PRESSURE: 87 MMHG | HEART RATE: 103 BPM | OXYGEN SATURATION: 97 % | RESPIRATION RATE: 16 BRPM

## 2022-04-26 DIAGNOSIS — Z04.9 ENCOUNTER FOR EXAMINATION AND OBSERVATION FOR UNSPECIFIED REASON: ICD-10-CM

## 2022-04-26 PROCEDURE — 99397 PER PM REEVAL EST PAT 65+ YR: CPT | Mod: 25

## 2022-04-26 RX ORDER — AMLODIPINE BESYLATE 10 MG/1
10 TABLET ORAL
Refills: 0 | Status: ACTIVE | COMMUNITY
Start: 2022-04-26

## 2022-04-26 NOTE — DISCUSSION/SUMMARY
[Patient seen for WTC Monitoring ___] : Patient was seen for WTC monitoring [unfilled] [Please See Note in Chart and Documentation in Trial DB] : Please see note in chart and documentation in Trial DB. [FreeTextEntry3] : HPI: 66 yr old white male evaluated for  [facility]  annual monitoring visit. \par \par \par  He is treated for GERD,  BPH, HTN, and HLD. He's had GERD since 1990 and has Low's esophagus. \par last EGD 2019 \par  GERD is worse in the past few eyars \par  had EGD 2021 / brought report , BE on report, no path available / \par PCP: Dr. 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 He worked every day for first few days then 3x week as it was part of his regular route. \par Occupational Hx: DOS\par \par Family History:heart disease and stomach  cancer \par Preventive Screening: \par Colonoscopy-2017 \par CXR-2019 , referred \par Prostate exam- followed by Urologist for BPH and elevated PSA, stated that had MRI  \par Allergies: ASA\par Meds: reviewed \par \par Smoking Status: non smoker  \par Review of Systems-IAMQ reviewed with patient\par \par \par Assessment and plan:\par [facility] annual  MV\par no new diagnosis or symptoms

## 2022-04-27 LAB
ALBUMIN SERPL ELPH-MCNC: 4.4 G/DL
ALP BLD-CCNC: 51 U/L
ALT SERPL-CCNC: 30 U/L
ANION GAP SERPL CALC-SCNC: 13 MMOL/L
APPEARANCE: CLEAR
AST SERPL-CCNC: 31 U/L
BACTERIA: NEGATIVE
BASOPHILS # BLD AUTO: 0.06 K/UL
BASOPHILS NFR BLD AUTO: 0.9 %
BILIRUB SERPL-MCNC: 0.3 MG/DL
BILIRUBIN URINE: NEGATIVE
BLOOD URINE: NEGATIVE
BUN SERPL-MCNC: 13 MG/DL
CALCIUM OXALATE CRYSTALS: ABNORMAL
CALCIUM SERPL-MCNC: 9.6 MG/DL
CHLORIDE SERPL-SCNC: 106 MMOL/L
CHOLEST SERPL-MCNC: 180 MG/DL
CO2 SERPL-SCNC: 24 MMOL/L
COLOR: YELLOW
CREAT SERPL-MCNC: 0.78 MG/DL
EGFR: 97 ML/MIN/1.73M2
EOSINOPHIL # BLD AUTO: 0.16 K/UL
EOSINOPHIL NFR BLD AUTO: 2.5 %
GLUCOSE QUALITATIVE U: NEGATIVE
GLUCOSE SERPL-MCNC: 89 MG/DL
HCT VFR BLD CALC: 49.1 %
HDLC SERPL-MCNC: 56 MG/DL
HGB BLD-MCNC: 15.6 G/DL
HYALINE CASTS: 1 /LPF
IMM GRANULOCYTES NFR BLD AUTO: 0.5 %
KETONES URINE: NEGATIVE
LDLC SERPL CALC-MCNC: 81 MG/DL
LEUKOCYTE ESTERASE URINE: NEGATIVE
LYMPHOCYTES # BLD AUTO: 1.53 K/UL
LYMPHOCYTES NFR BLD AUTO: 23.6 %
MAN DIFF?: NORMAL
MCHC RBC-ENTMCNC: 30.9 PG
MCHC RBC-ENTMCNC: 31.8 GM/DL
MCV RBC AUTO: 97.2 FL
MICROSCOPIC-UA: NORMAL
MONOCYTES # BLD AUTO: 0.6 K/UL
MONOCYTES NFR BLD AUTO: 9.3 %
NEUTROPHILS # BLD AUTO: 4.1 K/UL
NEUTROPHILS NFR BLD AUTO: 63.2 %
NITRITE URINE: NEGATIVE
NONHDLC SERPL-MCNC: 124 MG/DL
PH URINE: 5.5
PLATELET # BLD AUTO: 312 K/UL
POTASSIUM SERPL-SCNC: 4.3 MMOL/L
PROT SERPL-MCNC: 6.5 G/DL
PROTEIN URINE: NORMAL
RBC # BLD: 5.05 M/UL
RBC # FLD: 13.2 %
RED BLOOD CELLS URINE: 1 /HPF
SODIUM SERPL-SCNC: 144 MMOL/L
SPECIFIC GRAVITY URINE: 1.03
SQUAMOUS EPITHELIAL CELLS: 0 /HPF
TRIGL SERPL-MCNC: 212 MG/DL
UROBILINOGEN URINE: NORMAL
WBC # FLD AUTO: 6.48 K/UL
WHITE BLOOD CELLS URINE: 1 /HPF

## 2023-05-26 ENCOUNTER — APPOINTMENT (OUTPATIENT)
Dept: OTHER | Facility: CLINIC | Age: 68
End: 2023-05-26

## 2023-12-13 NOTE — PATIENT PROFILE ADULT - DO YOU FEEL UNSAFE AT HOME, WORK, OR SCHOOL?
Continue lasix/compression stockings. Take an extra dose of Lasix today due to swelling  Okay to take an extra dose of Lasix if your swelling worsens. LDL at goal, continue Lipitor 80 mg daily  Tolerating xarelto,  continue. Okay to hold 2 days prior to Colonoscopy.    BP at goal  Follow up with me in 6 months
no

## 2024-01-29 NOTE — ED ADULT TRIAGE NOTE - ARRIVAL FROM
Medication/Dose: pantoprazole (PROTONIX) 20 MG tablet (Discontinued)   Class: Historical Med    Reason for Discontinue: Reorder      Patient last seen by PCP: 8-25-24  Next office visit with PCP: 2-9-24  Last Lab:8-25-23  Last Ordered: never    Above information requires contacting patient: No    Prescription does not require PDMP check    Sent to provider to approve or deny    
Home

## 2025-02-25 DIAGNOSIS — Z00.00 ENCOUNTER FOR GENERAL ADULT MEDICAL EXAMINATION W/OUT ABNORMAL FINDINGS: ICD-10-CM

## 2025-02-27 ENCOUNTER — APPOINTMENT (OUTPATIENT)
Dept: ORTHOPEDIC SURGERY | Facility: CLINIC | Age: 70
End: 2025-02-27
Payer: MEDICARE

## 2025-02-27 VITALS — BODY MASS INDEX: 23.39 KG/M2 | HEIGHT: 64 IN | WEIGHT: 137 LBS

## 2025-02-27 DIAGNOSIS — M16.11 UNILATERAL PRIMARY OSTEOARTHRITIS, RIGHT HIP: ICD-10-CM

## 2025-02-27 PROCEDURE — 99205 OFFICE O/P NEW HI 60 MIN: CPT

## 2025-02-27 PROCEDURE — 73502 X-RAY EXAM HIP UNI 2-3 VIEWS: CPT

## 2025-02-27 PROCEDURE — G2211 COMPLEX E/M VISIT ADD ON: CPT

## 2025-03-14 ENCOUNTER — NON-APPOINTMENT (OUTPATIENT)
Age: 70
End: 2025-03-14

## 2025-04-02 ENCOUNTER — OUTPATIENT (OUTPATIENT)
Dept: OUTPATIENT SERVICES | Facility: HOSPITAL | Age: 70
LOS: 1 days | End: 2025-04-02
Payer: MEDICARE

## 2025-04-02 VITALS
SYSTOLIC BLOOD PRESSURE: 130 MMHG | HEART RATE: 78 BPM | HEIGHT: 62 IN | RESPIRATION RATE: 16 BRPM | WEIGHT: 134.92 LBS | TEMPERATURE: 100 F | DIASTOLIC BLOOD PRESSURE: 85 MMHG | OXYGEN SATURATION: 98 %

## 2025-04-02 DIAGNOSIS — Z98.89 OTHER SPECIFIED POSTPROCEDURAL STATES: Chronic | ICD-10-CM

## 2025-04-02 DIAGNOSIS — Z01.818 ENCOUNTER FOR OTHER PREPROCEDURAL EXAMINATION: ICD-10-CM

## 2025-04-02 DIAGNOSIS — Z98.890 OTHER SPECIFIED POSTPROCEDURAL STATES: Chronic | ICD-10-CM

## 2025-04-02 DIAGNOSIS — M16.11 UNILATERAL PRIMARY OSTEOARTHRITIS, RIGHT HIP: ICD-10-CM

## 2025-04-02 DIAGNOSIS — M25.551 PAIN IN RIGHT HIP: ICD-10-CM

## 2025-04-02 LAB
A1C WITH ESTIMATED AVERAGE GLUCOSE RESULT: 5.7 % — HIGH (ref 4–5.6)
ANION GAP SERPL CALC-SCNC: 15 MMOL/L — SIGNIFICANT CHANGE UP (ref 5–17)
BUN SERPL-MCNC: 11 MG/DL — SIGNIFICANT CHANGE UP (ref 7–23)
CALCIUM SERPL-MCNC: 10 MG/DL — SIGNIFICANT CHANGE UP (ref 8.4–10.5)
CHLORIDE SERPL-SCNC: 99 MMOL/L — SIGNIFICANT CHANGE UP (ref 96–108)
CO2 SERPL-SCNC: 24 MMOL/L — SIGNIFICANT CHANGE UP (ref 22–31)
CREAT SERPL-MCNC: 0.72 MG/DL — SIGNIFICANT CHANGE UP (ref 0.5–1.3)
EGFR: 98 ML/MIN/1.73M2 — SIGNIFICANT CHANGE UP
EGFR: 98 ML/MIN/1.73M2 — SIGNIFICANT CHANGE UP
ESTIMATED AVERAGE GLUCOSE: 117 MG/DL — HIGH (ref 68–114)
GLUCOSE SERPL-MCNC: 109 MG/DL — HIGH (ref 70–99)
HCT VFR BLD CALC: 45.2 % — SIGNIFICANT CHANGE UP (ref 39–50)
HGB BLD-MCNC: 14.3 G/DL — SIGNIFICANT CHANGE UP (ref 13–17)
MCHC RBC-ENTMCNC: 26.3 PG — LOW (ref 27–34)
MCHC RBC-ENTMCNC: 31.6 G/DL — LOW (ref 32–36)
MCV RBC AUTO: 83.1 FL — SIGNIFICANT CHANGE UP (ref 80–100)
MRSA PCR RESULT.: SIGNIFICANT CHANGE UP
NRBC BLD AUTO-RTO: 0 /100 WBCS — SIGNIFICANT CHANGE UP (ref 0–0)
PLATELET # BLD AUTO: 369 K/UL — SIGNIFICANT CHANGE UP (ref 150–400)
POTASSIUM SERPL-MCNC: 4.3 MMOL/L — SIGNIFICANT CHANGE UP (ref 3.5–5.3)
POTASSIUM SERPL-SCNC: 4.3 MMOL/L — SIGNIFICANT CHANGE UP (ref 3.5–5.3)
RBC # BLD: 5.44 M/UL — SIGNIFICANT CHANGE UP (ref 4.2–5.8)
RBC # FLD: 19.9 % — HIGH (ref 10.3–14.5)
S AUREUS DNA NOSE QL NAA+PROBE: SIGNIFICANT CHANGE UP
SODIUM SERPL-SCNC: 138 MMOL/L — SIGNIFICANT CHANGE UP (ref 135–145)
WBC # BLD: 5.98 K/UL — SIGNIFICANT CHANGE UP (ref 3.8–10.5)
WBC # FLD AUTO: 5.98 K/UL — SIGNIFICANT CHANGE UP (ref 3.8–10.5)

## 2025-04-02 PROCEDURE — 87640 STAPH A DNA AMP PROBE: CPT

## 2025-04-02 PROCEDURE — 85027 COMPLETE CBC AUTOMATED: CPT

## 2025-04-02 PROCEDURE — 83036 HEMOGLOBIN GLYCOSYLATED A1C: CPT

## 2025-04-02 PROCEDURE — 87641 MR-STAPH DNA AMP PROBE: CPT

## 2025-04-02 PROCEDURE — G0463: CPT

## 2025-04-02 PROCEDURE — 80048 BASIC METABOLIC PNL TOTAL CA: CPT

## 2025-04-02 RX ORDER — RABEPRAZOLE SODIUM 20 MG/1
1 TABLET, DELAYED RELEASE ORAL
Refills: 0 | DISCHARGE

## 2025-04-02 RX ORDER — METOPROLOL SUCCINATE 50 MG/1
1 TABLET, EXTENDED RELEASE ORAL
Refills: 0 | DISCHARGE

## 2025-04-02 RX ORDER — AMLODIPINE BESYLATE 10 MG/1
1 TABLET ORAL
Refills: 0 | DISCHARGE

## 2025-04-02 RX ORDER — ROSUVASTATIN CALCIUM 5 MG/1
1 TABLET, FILM COATED ORAL
Refills: 0 | DISCHARGE

## 2025-04-02 RX ORDER — FINASTERIDE 1 MG/1
1 TABLET, FILM COATED ORAL
Refills: 0 | DISCHARGE

## 2025-04-02 NOTE — H&P PST ADULT - RESPIRATORY
clear to auscultation bilaterally/no wheezes/no rales/no rhonchi/no respiratory distress/no use of accessory muscles/breath sounds equal

## 2025-04-02 NOTE — H&P PST ADULT - ASSESSMENT
Right Hip pain    Activity: walks a mile daily, walked from parking lot up stirs to PST today, home chores, take 3 flights of  stairs many fe times daily  Energy Expenditure score (DASI SCORE METS):   Symptoms : denies chest pain, palpitations, dyspnea, dizziness or  CUMMINGS,   Mallampati 2  Dental: Patient denies Loose teeth/ + denture set.    CAPRINI SCORE    AGE RELATED RISK FACTORS                                                             [ ] Age 41-60 years                                            (1 Point)  [x ] Age: 61-74 years                                           (2 Points)                 [ ] Age= 75 years                                                (3 Points)             DISEASE RELATED RISK FACTORS                                                       [ ] Edema in the lower extremities                 (1 Point)                     [ ] Varicose veins                                               (1 Point)                                 [ ] BMI > 25 Kg/m2                                            (1 Point)                                  [ ] Serious infection (ie PNA)                            (1 Point)                     [ ] Lung disease ( COPD, Emphysema)            (1 Point)                                                                          [ ] Acute myocardial infarction                         (1 Point)                  [ ] Congestive heart failure (in the previous month)  (1 Point)         [ ] Inflammatory bowel disease                            (1 Point)                  [ ] Central venous access, PICC or Port               (2 points)       (within the last month)                                                                [ ] Stroke (in the previous month)                        (5 Points)    [ ] Previous or present malignancy                       (2 points)                                                                                                                                                         HEMATOLOGY RELATED FACTORS                                                         [ ] Prior episodes of VTE                                     (3 Points)                     [ ] Positive family history for VTE                      (3 Points)                  [ ] Prothrombin 16166 A                                     (3 Points)                     [ ] Factor V Leiden                                                (3 Points)                        [ ] Lupus anticoagulants                                      (3 Points)                                                           [ ] Anticardiolipin antibodies                              (3 Points)                                                       [ ] High homocysteine in the blood                   (3 Points)                                             [ ] Other congenital or acquired thrombophilia      (3 Points)                                                [ ] Heparin induced thrombocytopenia                  (3 Points)                                        MOBILITY RELATED FACTORS  [ ] Bed rest                                                         (1 Point)  [ ] Plaster cast                                                    (2 points)  [ ] Bed bound for more than 72 hours           (2 Points)    GENDER SPECIFIC FACTORS  [ ] Pregnancy or had a baby within the last month   (1 Point)  [ ] Post-partum < 6 weeks                                   (1 Point)  [ ] Hormonal therapy  or oral contraception   (1 Point)  [ ] History of pregnancy complications              (1 point)  [ ] Unexplained or recurrent              (1 Point)    OTHER RISK FACTORS                                           (1 Point)  [ ] BMI >40, smoking, diabetes requiring insulin, chemotherapy  blood transfusions and length of surgery over 2 hours    SURGERY RELATED RISK FACTORS  [ ]  Section within the last month     (1 Point)  [ ] Minor surgery                                                  (1 Point)  [ ] Arthroscopic surgery                                       (2 Points)  [ ] Planned major surgery lasting more            (2 Points)      than 45 minutes     [x ] Elective hip or knee joint replacement       (5 points)       surgery                                                TRAUMA RELATED RISK FACTORS  [ ] Fracture of the hip, pelvis, or leg                       (5 Points)  [ ] Spinal cord injury resulting in paralysis             (5 points)       (in the previous month)    [ ] Paralysis  (less than 1 month)                             (5 Points)  [ ] Multiple Trauma within 1 month                        (5 Points)    Total Score [     7   ]    Caprini Score 0-2: Low Risk, NO VTE prophylaxis required for most patients, encourage ambulation  Caprini Score 3-6: Moderate Risk , pharmacologic VTE prophylaxis is indicated for most patients (in the absence of contraindications)  Caprini Score Greater than or =7: High risk, pharmacologic VTE prophylaxis indicated for most patients (in the absence of contraindications)                               Right Hip pain    Activity: walks a mile daily, walked from parking lot up stirs to PST today, home chores, take 3 flights of  stairs many few times daily  Energy Expenditure score (DASI SCORE METS): 7.2  Symptoms : denies chest pain, palpitations, dyspnea, dizziness or  CUMMINGS,   Mallampati 2  Dental: Patient denies Loose teeth/ + denture set.    CAPRINI SCORE    AGE RELATED RISK FACTORS                                                             [ ] Age 41-60 years                                            (1 Point)  [x ] Age: 61-74 years                                           (2 Points)                 [ ] Age= 75 years                                                (3 Points)             DISEASE RELATED RISK FACTORS                                                       [ ] Edema in the lower extremities                 (1 Point)                     [ ] Varicose veins                                               (1 Point)                                 [ ] BMI > 25 Kg/m2                                            (1 Point)                                  [ ] Serious infection (ie PNA)                            (1 Point)                     [ ] Lung disease ( COPD, Emphysema)            (1 Point)                                                                          [ ] Acute myocardial infarction                         (1 Point)                  [ ] Congestive heart failure (in the previous month)  (1 Point)         [ ] Inflammatory bowel disease                            (1 Point)                  [ ] Central venous access, PICC or Port               (2 points)       (within the last month)                                                                [ ] Stroke (in the previous month)                        (5 Points)    [ ] Previous or present malignancy                       (2 points)                                                                                                                                                         HEMATOLOGY RELATED FACTORS                                                         [ ] Prior episodes of VTE                                     (3 Points)                     [ ] Positive family history for VTE                      (3 Points)                  [ ] Prothrombin 39638 A                                     (3 Points)                     [ ] Factor V Leiden                                                (3 Points)                        [ ] Lupus anticoagulants                                      (3 Points)                                                           [ ] Anticardiolipin antibodies                              (3 Points)                                                       [ ] High homocysteine in the blood                   (3 Points)                                             [ ] Other congenital or acquired thrombophilia      (3 Points)                                                [ ] Heparin induced thrombocytopenia                  (3 Points)                                        MOBILITY RELATED FACTORS  [ ] Bed rest                                                         (1 Point)  [ ] Plaster cast                                                    (2 points)  [ ] Bed bound for more than 72 hours           (2 Points)    GENDER SPECIFIC FACTORS  [ ] Pregnancy or had a baby within the last month   (1 Point)  [ ] Post-partum < 6 weeks                                   (1 Point)  [ ] Hormonal therapy  or oral contraception   (1 Point)  [ ] History of pregnancy complications              (1 point)  [ ] Unexplained or recurrent              (1 Point)    OTHER RISK FACTORS                                           (1 Point)  [ ] BMI >40, smoking, diabetes requiring insulin, chemotherapy  blood transfusions and length of surgery over 2 hours    SURGERY RELATED RISK FACTORS  [ ]  Section within the last month     (1 Point)  [ ] Minor surgery                                                  (1 Point)  [ ] Arthroscopic surgery                                       (2 Points)  [ ] Planned major surgery lasting more            (2 Points)      than 45 minutes     [x ] Elective hip or knee joint replacement       (5 points)       surgery                                                TRAUMA RELATED RISK FACTORS  [ ] Fracture of the hip, pelvis, or leg                       (5 Points)  [ ] Spinal cord injury resulting in paralysis             (5 points)       (in the previous month)    [ ] Paralysis  (less than 1 month)                             (5 Points)  [ ] Multiple Trauma within 1 month                        (5 Points)    Total Score [     7   ]    Caprini Score 0-2: Low Risk, NO VTE prophylaxis required for most patients, encourage ambulation  Caprini Score 3-6: Moderate Risk , pharmacologic VTE prophylaxis is indicated for most patients (in the absence of contraindications)  Caprini Score Greater than or =7: High risk, pharmacologic VTE prophylaxis indicated for most patients (in the absence of contraindications)

## 2025-04-02 NOTE — H&P PST ADULT - HISTORY OF PRESENT ILLNESS
69 year old male with known  "osteoarthritis with right hip, groin and thigh pain for couple of years, pain progressively  got worse to severe in intensity for last five months with difficulty walking , coming down stairs and  getting out of the car. Pt endorses having right hip pain with numbness & numbness radiates down to right feet affecting  his daily activities  & not responding with pain medication or physical therapy. Pain and restriction of function are intolerable at this time. Pt denies history of fall or injury in the past. S/p orthopedic consult & scheduled for Right total hip arthroplasty , direct anterior approach on 04/23/25.    PMH; Seizure x2 ( due to fever in 2009 ), HTN, HLD, BPH, OA, diverticulitis -partial colectomy & lithotripsy   69 year old male with known  "osteoarthritis with right hip, groin and thigh pain for couple of years, pain progressively  got worse to severe in intensity for last five months with difficulty walking , coming down stairs and  getting out of the car'. Pt endorses having right hip pain with numbness & numbness radiates down to right feet affecting  his daily activities  & not responding with pain medication or physical therapy. Pain and restriction of function are intolerable at this time. Pt denies history of fall or injury in the past. S/p orthopedic consult & scheduled for Right total hip arthroplasty , direct anterior approach on 04/23/25.    PMH; Seizure x2 ( due to fever in 2009 ), HTN, HLD, BPH, OA, diverticulitis -partial colectomy & lithotripsy

## 2025-04-02 NOTE — H&P PST ADULT - MUSCULOSKELETAL
right hip ROM limited due to pain/no joint swelling/no joint erythema/no calf tenderness/decreased ROM due to pain/normal gait/strength 5/5 bilateral upper extremities details…

## 2025-04-02 NOTE — H&P PST ADULT - PROBLEM SELECTOR PLAN 1
Right Total hip Arthroplasty, direct anterior approach on 04/23/25.  Instructed to continue meds to OR.     Instructed to inform surgeon & seek medical attention if any changes in health  status  and will call surgeon's office for revised instructions if surgery is rescheduled. PST  instructions given in written/ explained about NPO/ pre op hy, pre op skin care  with antibacterial chlorhexidine wash x3 days preop(Hibicleans given) and ARRIVAL in SDA, TIME  2 hours prior to OR time.Pre-op education provided - all questions answered. Pt verbalized understanding.

## 2025-04-02 NOTE — H&P PST ADULT - NSICDXPASTMEDICALHX_GEN_ALL_CORE_FT
PAST MEDICAL HISTORY:  BPH (benign prostatic hyperplasia)     Diverticulitis     Gastritis     Hyperlipidemia     Right hip pain     Seizure

## 2025-04-02 NOTE — H&P PST ADULT - NSICDXPASTSURGICALHX_GEN_ALL_CORE_FT
PAST SURGICAL HISTORY:  H/O lithotripsy     History of appendectomy     History of open sigmoidectomy

## 2025-04-02 NOTE — H&P PST ADULT - NSICDXPROCEDURE_GEN_ALL_CORE_FT
PROCEDURES:  Right total hip arthroplasty 02-Apr-2025 07:31:09 Direct anterior approach on 04/23/25. Corky Gallardo

## 2025-04-02 NOTE — H&P PST ADULT - HEMATOLOGY/LYMPHATICS
negative
Breath sounds are clear, no distress present, no wheeze, rales, rhonchi or tachypnea. Normal rate and effort.

## 2025-04-08 ENCOUNTER — NON-APPOINTMENT (OUTPATIENT)
Age: 70
End: 2025-04-08

## 2025-04-17 DIAGNOSIS — Z96.641 PRESENCE OF RIGHT ARTIFICIAL HIP JOINT: ICD-10-CM

## 2025-04-17 RX ORDER — ASPIRIN 81 MG/1
81 TABLET ORAL
Qty: 60 | Refills: 0 | Status: ACTIVE | COMMUNITY
Start: 2025-04-17 | End: 1900-01-01

## 2025-04-17 RX ORDER — NAPROXEN 500 MG/1
500 TABLET ORAL
Qty: 60 | Refills: 2 | Status: ACTIVE | COMMUNITY
Start: 2025-04-17 | End: 1900-01-01

## 2025-04-17 RX ORDER — PANTOPRAZOLE 40 MG/1
40 TABLET, DELAYED RELEASE ORAL DAILY
Qty: 30 | Refills: 1 | Status: ACTIVE | COMMUNITY
Start: 2025-04-17 | End: 1900-01-01

## 2025-04-17 RX ORDER — OXYCODONE 5 MG/1
5 TABLET ORAL
Qty: 28 | Refills: 0 | Status: ACTIVE | COMMUNITY
Start: 2025-04-17 | End: 1900-01-01

## 2025-04-17 RX ORDER — TRAMADOL HYDROCHLORIDE 50 MG/1
50 TABLET, COATED ORAL
Qty: 28 | Refills: 0 | Status: ACTIVE | COMMUNITY
Start: 2025-04-17 | End: 1900-01-01

## 2025-04-21 PROBLEM — M25.551 PAIN IN RIGHT HIP: Chronic | Status: ACTIVE | Noted: 2025-04-02

## 2025-04-23 ENCOUNTER — APPOINTMENT (OUTPATIENT)
Dept: ORTHOPEDIC SURGERY | Facility: HOSPITAL | Age: 70
End: 2025-04-23

## 2025-04-23 ENCOUNTER — TRANSCRIPTION ENCOUNTER (OUTPATIENT)
Age: 70
End: 2025-04-23

## 2025-04-23 ENCOUNTER — OUTPATIENT (OUTPATIENT)
Dept: OUTPATIENT SERVICES | Facility: HOSPITAL | Age: 70
LOS: 1 days | End: 2025-04-23
Payer: MEDICARE

## 2025-04-23 ENCOUNTER — RESULT REVIEW (OUTPATIENT)
Age: 70
End: 2025-04-23

## 2025-04-23 VITALS
HEART RATE: 98 BPM | DIASTOLIC BLOOD PRESSURE: 65 MMHG | TEMPERATURE: 97 F | SYSTOLIC BLOOD PRESSURE: 144 MMHG | OXYGEN SATURATION: 97 % | RESPIRATION RATE: 18 BRPM

## 2025-04-23 VITALS
HEART RATE: 85 BPM | SYSTOLIC BLOOD PRESSURE: 102 MMHG | WEIGHT: 134.92 LBS | OXYGEN SATURATION: 97 % | TEMPERATURE: 98 F | HEIGHT: 62 IN | RESPIRATION RATE: 15 BRPM | DIASTOLIC BLOOD PRESSURE: 67 MMHG

## 2025-04-23 DIAGNOSIS — M16.11 UNILATERAL PRIMARY OSTEOARTHRITIS, RIGHT HIP: ICD-10-CM

## 2025-04-23 DIAGNOSIS — Z98.89 OTHER SPECIFIED POSTPROCEDURAL STATES: Chronic | ICD-10-CM

## 2025-04-23 DIAGNOSIS — Z98.890 OTHER SPECIFIED POSTPROCEDURAL STATES: Chronic | ICD-10-CM

## 2025-04-23 PROCEDURE — 97165 OT EVAL LOW COMPLEX 30 MIN: CPT

## 2025-04-23 PROCEDURE — 27130 TOTAL HIP ARTHROPLASTY: CPT | Mod: RT

## 2025-04-23 PROCEDURE — C1776: CPT

## 2025-04-23 PROCEDURE — 82962 GLUCOSE BLOOD TEST: CPT

## 2025-04-23 PROCEDURE — 97161 PT EVAL LOW COMPLEX 20 MIN: CPT

## 2025-04-23 PROCEDURE — 72170 X-RAY EXAM OF PELVIS: CPT

## 2025-04-23 PROCEDURE — 72170 X-RAY EXAM OF PELVIS: CPT | Mod: 26

## 2025-04-23 DEVICE — SHELL ACET TRIDENT II E 52MM: Type: IMPLANTABLE DEVICE | Site: RIGHT | Status: FUNCTIONAL

## 2025-04-23 DEVICE — STEM NECK ANG HIP V40 127D SZ 6 35X111MM: Type: IMPLANTABLE DEVICE | Site: RIGHT | Status: FUNCTIONAL

## 2025-04-23 DEVICE — LINER ACET TRIDENT X3 0 DEG 36MM E: Type: IMPLANTABLE DEVICE | Site: RIGHT | Status: FUNCTIONAL

## 2025-04-23 DEVICE — HEAD FEM CERAMIC V40 36MM OD -2.5MM: Type: IMPLANTABLE DEVICE | Site: RIGHT | Status: FUNCTIONAL

## 2025-04-23 RX ORDER — ACETAMINOPHEN 500 MG/5ML
1000 LIQUID (ML) ORAL ONCE
Refills: 0 | Status: DISCONTINUED | OUTPATIENT
Start: 2025-04-23 | End: 2025-05-07

## 2025-04-23 RX ORDER — MAGNESIUM HYDROXIDE 400 MG/5ML
30 SUSPENSION ORAL DAILY
Refills: 0 | Status: DISCONTINUED | OUTPATIENT
Start: 2025-04-23 | End: 2025-05-07

## 2025-04-23 RX ORDER — AMLODIPINE BESYLATE 10 MG/1
1 TABLET ORAL
Refills: 0 | DISCHARGE

## 2025-04-23 RX ORDER — SENNA 187 MG
2 TABLET ORAL
Qty: 0 | Refills: 0 | DISCHARGE

## 2025-04-23 RX ORDER — RABEPRAZOLE SODIUM 20 MG/1
1 TABLET, DELAYED RELEASE ORAL
Refills: 0 | DISCHARGE

## 2025-04-23 RX ORDER — TRAMADOL HYDROCHLORIDE 50 MG/1
1 TABLET, FILM COATED ORAL
Qty: 0 | Refills: 0 | DISCHARGE

## 2025-04-23 RX ORDER — ROSUVASTATIN CALCIUM 20 MG/1
5 TABLET, FILM COATED ORAL AT BEDTIME
Refills: 0 | Status: DISCONTINUED | OUTPATIENT
Start: 2025-04-23 | End: 2025-05-07

## 2025-04-23 RX ORDER — HYDROMORPHONE/SOD CHLOR,ISO/PF 2 MG/10 ML
0.5 SYRINGE (ML) INJECTION
Refills: 0 | Status: DISCONTINUED | OUTPATIENT
Start: 2025-04-23 | End: 2025-04-23

## 2025-04-23 RX ORDER — CEFAZOLIN SODIUM IN 0.9 % NACL 3 G/100 ML
2000 INTRAVENOUS SOLUTION, PIGGYBACK (ML) INTRAVENOUS ONCE
Refills: 0 | Status: COMPLETED | OUTPATIENT
Start: 2025-04-23 | End: 2025-04-23

## 2025-04-23 RX ORDER — ONDANSETRON HCL/PF 4 MG/2 ML
4 VIAL (ML) INJECTION EVERY 6 HOURS
Refills: 0 | Status: DISCONTINUED | OUTPATIENT
Start: 2025-04-23 | End: 2025-05-07

## 2025-04-23 RX ORDER — OXYCODONE HYDROCHLORIDE 30 MG/1
1 TABLET ORAL
Qty: 0 | Refills: 0 | DISCHARGE

## 2025-04-23 RX ORDER — FINASTERIDE 1 MG/1
1 TABLET, FILM COATED ORAL
Refills: 0 | DISCHARGE

## 2025-04-23 RX ORDER — TRAMADOL HYDROCHLORIDE 50 MG/1
50 TABLET, FILM COATED ORAL EVERY 6 HOURS
Refills: 0 | Status: DISCONTINUED | OUTPATIENT
Start: 2025-04-23 | End: 2025-04-23

## 2025-04-23 RX ORDER — METOPROLOL SUCCINATE 50 MG/1
50 TABLET, EXTENDED RELEASE ORAL DAILY
Refills: 0 | Status: DISCONTINUED | OUTPATIENT
Start: 2025-04-23 | End: 2025-05-07

## 2025-04-23 RX ORDER — SENNA 187 MG
2 TABLET ORAL AT BEDTIME
Refills: 0 | Status: DISCONTINUED | OUTPATIENT
Start: 2025-04-23 | End: 2025-05-07

## 2025-04-23 RX ORDER — OXYCODONE HYDROCHLORIDE 30 MG/1
5 TABLET ORAL EVERY 4 HOURS
Refills: 0 | Status: DISCONTINUED | OUTPATIENT
Start: 2025-04-23 | End: 2025-04-23

## 2025-04-23 RX ORDER — ROSUVASTATIN CALCIUM 5 MG/1
1 TABLET, FILM COATED ORAL
Refills: 0 | DISCHARGE

## 2025-04-23 RX ORDER — B1/B2/B3/B5/B6/B12/VIT C/FOLIC 500-0.5 MG
1 TABLET ORAL DAILY
Refills: 0 | Status: DISCONTINUED | OUTPATIENT
Start: 2025-04-23 | End: 2025-05-07

## 2025-04-23 RX ORDER — ACETAMINOPHEN 500 MG/5ML
3 LIQUID (ML) ORAL
Qty: 0 | Refills: 0 | DISCHARGE

## 2025-04-23 RX ORDER — KETOROLAC TROMETHAMINE 30 MG/ML
15 INJECTION, SOLUTION INTRAMUSCULAR; INTRAVENOUS EVERY 6 HOURS
Refills: 0 | Status: COMPLETED | OUTPATIENT
Start: 2025-04-23 | End: 2025-04-24

## 2025-04-23 RX ORDER — LIDOCAINE HCL/PF 10 MG/ML
0.2 VIAL (ML) INJECTION ONCE
Refills: 0 | Status: COMPLETED | OUTPATIENT
Start: 2025-04-23 | End: 2025-04-23

## 2025-04-23 RX ORDER — FINASTERIDE 1 MG/1
5 TABLET, FILM COATED ORAL DAILY
Refills: 0 | Status: DISCONTINUED | OUTPATIENT
Start: 2025-04-23 | End: 2025-05-07

## 2025-04-23 RX ORDER — CEFAZOLIN SODIUM IN 0.9 % NACL 3 G/100 ML
2000 INTRAVENOUS SOLUTION, PIGGYBACK (ML) INTRAVENOUS EVERY 8 HOURS
Refills: 0 | Status: COMPLETED | OUTPATIENT
Start: 2025-04-23 | End: 2025-04-23

## 2025-04-23 RX ORDER — NAPROXEN SODIUM 275 MG
1 TABLET ORAL
Qty: 0 | Refills: 0 | DISCHARGE

## 2025-04-23 RX ORDER — POLYETHYLENE GLYCOL 3350 17 G/17G
17 POWDER, FOR SOLUTION ORAL AT BEDTIME
Refills: 0 | Status: DISCONTINUED | OUTPATIENT
Start: 2025-04-23 | End: 2025-05-07

## 2025-04-23 RX ORDER — AMLODIPINE BESYLATE 10 MG/1
10 TABLET ORAL DAILY
Refills: 0 | Status: DISCONTINUED | OUTPATIENT
Start: 2025-04-23 | End: 2025-05-07

## 2025-04-23 RX ORDER — TRAMADOL HYDROCHLORIDE 50 MG/1
50 TABLET, FILM COATED ORAL ONCE
Refills: 0 | Status: DISCONTINUED | OUTPATIENT
Start: 2025-04-23 | End: 2025-04-23

## 2025-04-23 RX ORDER — ASPIRIN 325 MG
81 TABLET ORAL
Refills: 0 | Status: DISCONTINUED | OUTPATIENT
Start: 2025-04-23 | End: 2025-05-07

## 2025-04-23 RX ORDER — ASPIRIN 325 MG
1 TABLET ORAL
Qty: 0 | Refills: 0 | DISCHARGE

## 2025-04-23 RX ORDER — METOPROLOL SUCCINATE 50 MG/1
1 TABLET, EXTENDED RELEASE ORAL
Refills: 0 | DISCHARGE

## 2025-04-23 RX ORDER — OXYCODONE HYDROCHLORIDE 30 MG/1
10 TABLET ORAL EVERY 4 HOURS
Refills: 0 | Status: DISCONTINUED | OUTPATIENT
Start: 2025-04-23 | End: 2025-04-23

## 2025-04-23 RX ORDER — ONDANSETRON HCL/PF 4 MG/2 ML
4 VIAL (ML) INJECTION ONCE
Refills: 0 | Status: DISCONTINUED | OUTPATIENT
Start: 2025-04-23 | End: 2025-05-07

## 2025-04-23 RX ADMIN — Medication 500 MILLILITER(S): at 10:00

## 2025-04-23 RX ADMIN — Medication 1 APPLICATION(S): at 06:29

## 2025-04-23 RX ADMIN — TRAMADOL HYDROCHLORIDE 50 MILLIGRAM(S): 50 TABLET, FILM COATED ORAL at 06:53

## 2025-04-23 RX ADMIN — Medication 3 MILLILITER(S): at 06:24

## 2025-04-23 RX ADMIN — Medication 40 MILLIGRAM(S): at 06:29

## 2025-04-23 RX ADMIN — Medication 100 MILLIGRAM(S): at 12:23

## 2025-04-23 NOTE — ASU DISCHARGE PLAN (ADULT/PEDIATRIC) - ASU DC SPECIAL INSTRUCTIONSFT
Goal: Pain free walking    Please follow up with Dr. Alfredo at your scheduled follow up appointment in 2 weeks (Call office to confirm appointment).  PT-weight bearing as tolerated.  Aspirin 81mg twice daily x 4 weeks total for dvt prevention.  Keep dressing clean, dry and intact until date listed on dressing.  Have doctor remove any sutures (if applicable) at follow up visit.      Please follow up with your PMD within 1 month for routine checkup.

## 2025-04-23 NOTE — ASU PREOP CHECKLIST - SIDE RAILS UP
Render In Strict Bullet Format?: No
Plan: Instructed pt to not trim nails, if no improvement will clip nail for testing.
Detail Level: Zone
Continue Regimen: terbinafine HCl 250 mg tablet \\nQuantity: 84.0 Tablet  Days Supply: 84\\nSig: Take one tablet daily for 12 weeks (continue for 8 weeks)
n/a

## 2025-04-23 NOTE — ASU DISCHARGE PLAN (ADULT/PEDIATRIC) - NURSING INSTRUCTIONS
NEXT DOSE OF TYLENOL IS OK TO TAKE AT _____________________________, IF NEEDED FOR PAIN. **Do not exceed more than 4000mg of Tylenol in one 24 hour setting.**  NEXT DOSE OF IBUPROFEN  IS OK TO TAKE AT ___________________________ , IF NEEDED FOR PAIN.

## 2025-04-23 NOTE — ASU DISCHARGE PLAN (ADULT/PEDIATRIC) - CARE PROVIDER_API CALL
Vaughn Alfredo  [facility]  [address], [address]  [place] [address]  Phone: [phone]  Fax: [phone]  Follow Up Time:

## 2025-04-23 NOTE — PHYSICAL THERAPY INITIAL EVALUATION ADULT - PERTINENT HX OF CURRENT PROBLEM, REHAB EVAL
69 y.o. M PMH Seizure x2 ( due to fever in 2009 ), HTN, HLD, BPH, OA, diverticulitis -partial colectomy & lithotripsy, osteoarthritis with right hip, groin and thigh pain for couple of years, pain progressively  got worse to severe in intensity for last five months with difficulty walking , coming down stairs and  getting out of the car'. Pt endorses having right hip pain with numbness & numbness radiates down to right feet affecting  his daily activities  & not responding with pain medication or physical therapy. Pain and restriction of function are intolerable at this time. Pt denies history of fall or injury in the past. S/p orthopedic consult. Now s/p R total hip replacement, anterior approach, on 4/23/25.

## 2025-04-23 NOTE — OCCUPATIONAL THERAPY INITIAL EVALUATION ADULT - ADDITIONAL COMMENTS
pt reports lives in private home with spouse, 5 steps to enter, flight of stairs inside, tub shower. Prior to admission Ind with ADLs/ambulating with cane. no AD/DME. Owns RW.

## 2025-04-23 NOTE — OCCUPATIONAL THERAPY INITIAL EVALUATION ADULT - PERTINENT HX OF CURRENT PROBLEM, REHAB EVAL
69 year old male with known  "osteoarthritis with right hip, groin and thigh pain for couple of years, pain progressively  got worse to severe in intensity for last five months with difficulty walking , coming down stairs and  getting out of the car'. Pt endorses having right hip pain with numbness & numbness radiates down to right feet affecting  his daily activities  & not responding with pain medication or physical therapy. Pain and restriction of function are intolerable at this time. Pt denies history of fall or injury in the past. S/p orthopedic consult & scheduled for Right total hip arthroplasty , direct anterior approach on 04/23/25.PMH; Seizure x2 ( due to fever in 2009 ), HTN, HLD, BPH, OA, diverticulitis -partial colectomy & lithotripsy. pt now s/p R BRIAN anterior 2/23

## 2025-04-23 NOTE — PRE-ANESTHESIA EVALUATION ADULT - MALLAMPATI CLASS
including a layer of surrounding healthy tissue down through and including subcutaneous tissue,using curette. Excisional Debridement  Percent of Wound Debrided: 100%    Wound: has improved    Bleeding: Minimal    Hemostasis:   by pressure    Response to treatment:  Well tolerated by patient. Assessment:      Diagnosis Orders   1. Ulcer of left foot with necrosis of muscle (HCC)  SD DEBRIDEMENT SUBCUTANEOUS TISSUE 20 SQ CM/<      2. Pain in left foot  SD DEBRIDEMENT SUBCUTANEOUS TISSUE 20 SQ CM/<      3. Diabetic polyneuropathy associated with type 2 diabetes mellitus (HCC)  SD DEBRIDEMENT SUBCUTANEOUS TISSUE 20 SQ CM/<              Plan:   Plan for wound -   Treatment:                Dressed with: Kathryn and a dry sterile dressing. Home care: Patient to change the dressing to the left foot daily with Kathryn and a dry sterile dressing. Abx :No Cultures :were notobtained. Return in about 1 week (around 4/25/2023) for Wound Care.    4/18/2023        Devin David DPM
Class II - visualization of the soft palate, fauces, and uvula

## 2025-04-23 NOTE — ASU DISCHARGE PLAN (ADULT/PEDIATRIC) - FINANCIAL ASSISTANCE
[facility] provides services at a reduced cost to those who are determined to be eligible through [facility]’s financial assistance program. Information regarding [facility]’s financial assistance program can be found by going to [email or web address] or by calling [phone].

## 2025-04-23 NOTE — ASU DISCHARGE PLAN (ADULT/PEDIATRIC) - NS MD DC FALL RISK RISK
For information on Fall & Injury Prevention, visit: [email or web address] OR  [email or web address] OR  https://www.cdc.gov/steadi/patient.html

## 2025-04-23 NOTE — PHYSICAL THERAPY INITIAL EVALUATION ADULT - ADDITIONAL COMMENTS
Pt resides in a pvt home w/ spouse, 5 steps to enter (+HR), additional flight of stairs inside. PTA pt was independent with all mobility & ADL's. Utilized a cane for ambulation, also owns a RW.

## 2025-04-24 ENCOUNTER — NON-APPOINTMENT (OUTPATIENT)
Age: 70
End: 2025-04-24

## 2025-04-30 ENCOUNTER — NON-APPOINTMENT (OUTPATIENT)
Age: 70
End: 2025-04-30

## 2025-05-06 ENCOUNTER — NON-APPOINTMENT (OUTPATIENT)
Age: 70
End: 2025-05-06

## 2025-05-08 ENCOUNTER — APPOINTMENT (OUTPATIENT)
Dept: ORTHOPEDIC SURGERY | Facility: CLINIC | Age: 70
End: 2025-05-08
Payer: MEDICARE

## 2025-05-08 VITALS — HEIGHT: 64 IN | BODY MASS INDEX: 23.39 KG/M2 | WEIGHT: 137 LBS

## 2025-05-08 VITALS — BODY MASS INDEX: 23.39 KG/M2 | WEIGHT: 137 LBS | HEIGHT: 64 IN

## 2025-05-08 DIAGNOSIS — Z96.641 PRESENCE OF RIGHT ARTIFICIAL HIP JOINT: ICD-10-CM

## 2025-05-08 PROCEDURE — 73502 X-RAY EXAM HIP UNI 2-3 VIEWS: CPT | Mod: 26,RT

## 2025-05-08 PROCEDURE — 99024 POSTOP FOLLOW-UP VISIT: CPT

## 2025-05-29 ENCOUNTER — NON-APPOINTMENT (OUTPATIENT)
Age: 70
End: 2025-05-29

## 2025-06-12 ENCOUNTER — APPOINTMENT (OUTPATIENT)
Dept: ORTHOPEDIC SURGERY | Facility: CLINIC | Age: 70
End: 2025-06-12
Payer: MEDICARE

## 2025-06-12 VITALS — HEIGHT: 64 IN | BODY MASS INDEX: 23.39 KG/M2 | WEIGHT: 137 LBS

## 2025-06-12 PROBLEM — M16.11 ARTHRITIS OF RIGHT HIP: Status: RESOLVED | Noted: 2025-02-27 | Resolved: 2025-06-12

## 2025-06-12 PROCEDURE — 99024 POSTOP FOLLOW-UP VISIT: CPT

## 2025-07-08 ENCOUNTER — NON-APPOINTMENT (OUTPATIENT)
Age: 70
End: 2025-07-08

## 2025-08-23 ENCOUNTER — NON-APPOINTMENT (OUTPATIENT)
Age: 70
End: 2025-08-23

## 2025-09-20 ENCOUNTER — NON-APPOINTMENT (OUTPATIENT)
Age: 70
End: 2025-09-20

## (undated) DEVICE — SUT QUILL MONODERM 2-0 3/8 CIRCLE 45CM

## (undated) DEVICE — HOOD FLYTE STRYKER HELMET SHIELD

## (undated) DEVICE — ELCTR BOVIE PENCIL SMOKE EVACUATION

## (undated) DEVICE — DRAPE SUPINE ESYSUIT

## (undated) DEVICE — STRYKER PULSE LAVAGE WITH HIGH FLOW TIP

## (undated) DEVICE — WARMING BLANKET UPPER ADULT

## (undated) DEVICE — SOL IRR POUR NS 0.9% 500ML

## (undated) DEVICE — SUT PDO 2 1/2 CIRCLE 40MM NDL 45CM

## (undated) DEVICE — GLV 8.5 PROTEXIS (WHITE)

## (undated) DEVICE — HOOD FLYTE STRYKER SURGICOOL W PEELAWAY

## (undated) DEVICE — DRSG AQUACEL 3.5 X 6"

## (undated) DEVICE — SUT QUILL MONODERM 0 1/2 CIRCLE TAPR 45CM 26MM

## (undated) DEVICE — DRSG DERMABOND 0.7ML

## (undated) DEVICE — GRIPPER MEDENVISION

## (undated) DEVICE — SOL IRR BAG NS 0.9% 1000ML

## (undated) DEVICE — DRAPE 1/2 SHEET 40X57"

## (undated) DEVICE — SUT MONOCRYL 2-0 27" SH UNDYED

## (undated) DEVICE — DRAPE 3/4 SHEET W REINFORCEMENT 56X77"

## (undated) DEVICE — TUBING TUR 2 PRONG

## (undated) DEVICE — DRAPE U (CLEAR) 47 X 51"

## (undated) DEVICE — SUT STRATAFIX SYMMETRIC PDS PLUS 1 18" CTX VIOLET

## (undated) DEVICE — LIGHT PIPE

## (undated) DEVICE — VENODYNE/SCD SLEEVE CALF LARGE

## (undated) DEVICE — SAW BLADE STRYKER DUAL CUT 1.27X11 X90MM

## (undated) DEVICE — GLV 8.5 PROTEXIS (BLUE)

## (undated) DEVICE — SOL IRR POUR H2O 1000ML

## (undated) DEVICE — SPECIMEN CONTAINER 100ML